# Patient Record
Sex: MALE | Race: WHITE | Employment: OTHER | URBAN - METROPOLITAN AREA
[De-identification: names, ages, dates, MRNs, and addresses within clinical notes are randomized per-mention and may not be internally consistent; named-entity substitution may affect disease eponyms.]

---

## 2017-01-01 ENCOUNTER — OFFICE VISIT (OUTPATIENT)
Dept: ORTHOPEDIC SURGERY | Age: 82
End: 2017-01-01

## 2017-01-01 VITALS — HEIGHT: 69 IN | BODY MASS INDEX: 26.66 KG/M2 | WEIGHT: 180 LBS

## 2017-01-01 DIAGNOSIS — M25.561 PAIN, JOINT, KNEE, RIGHT: Primary | ICD-10-CM

## 2017-01-01 DIAGNOSIS — R29.898 WEAKNESS OF RIGHT LOWER EXTREMITY: ICD-10-CM

## 2017-01-01 DIAGNOSIS — R26.89 BALANCE PROBLEM: ICD-10-CM

## 2017-01-01 PROCEDURE — 73562 X-RAY EXAM OF KNEE 3: CPT | Performed by: PHYSICIAN ASSISTANT

## 2017-01-01 PROCEDURE — 4040F PNEUMOC VAC/ADMIN/RCVD: CPT | Performed by: PHYSICIAN ASSISTANT

## 2017-01-01 PROCEDURE — G8484 FLU IMMUNIZE NO ADMIN: HCPCS | Performed by: PHYSICIAN ASSISTANT

## 2017-01-01 PROCEDURE — G8419 CALC BMI OUT NRM PARAM NOF/U: HCPCS | Performed by: PHYSICIAN ASSISTANT

## 2017-01-01 PROCEDURE — 99213 OFFICE O/P EST LOW 20 MIN: CPT | Performed by: PHYSICIAN ASSISTANT

## 2017-01-01 PROCEDURE — 1123F ACP DISCUSS/DSCN MKR DOCD: CPT | Performed by: PHYSICIAN ASSISTANT

## 2017-01-01 PROCEDURE — 1036F TOBACCO NON-USER: CPT | Performed by: PHYSICIAN ASSISTANT

## 2017-01-01 PROCEDURE — G8427 DOCREV CUR MEDS BY ELIG CLIN: HCPCS | Performed by: PHYSICIAN ASSISTANT

## 2017-10-04 PROBLEM — R29.898 WEAKNESS OF RIGHT LOWER EXTREMITY: Status: ACTIVE | Noted: 2017-01-01

## 2017-10-04 NOTE — PROGRESS NOTES
Chief Complaint    Knee Pain (rt knee/leg keeps giving,  not sure if it the knee, has had several falls; hx of tkr (bilat) yrs ago)      History of Present Illness:  Cris Leblanc is a 80 y.o. male who comes in today accompanied by his son for evaluation treatment of right leg weakness. He is referred in today for the consultation request of his PCP Dr. Yolette Mendieta. He and his son both report a several month history of the right leg giving out on him. He has a history of a right total knee replacement which was performed by one of the [facility] doctors about 20 years ago. He's had no complaints of any pain in the right knee. He did have a significant fall a little over a week ago where he injured his right arm sustained a small superficial laceration. The patient does ambulate with a walker. He denies any hip or groin pain. He denies any complaints of any numbness or tingling. There is no other symptomatic complaints other than the occasional instability in the right knee. Pain Assessment  Location of Pain: Leg  Location Modifiers: Right  Severity of Pain: 0 (no strength)  Limiting Behavior: Yes  Result of Injury: Yes  Work-Related Injury: No  Are there other pain locations you wish to document?: No    Medical History:  Patient's medications, allergies, past medical, surgical, social and family histories were reviewed and updated as appropriate. Review of Systems:  Pertinent items are noted in HPI  Review of systems reviewed from Patient History Form dated on 10/4/2017 and available in the patient's chart under the Media tab. Vital Signs:  Ht 5' 9\" (1.753 m)  Wt 180 lb (81.6 kg)  BMI 26.58 kg/m2    General Exam:   Constitutional: Patient is adequately groomed with no evidence of malnutrition  DTRs: Deep tendon reflexes are intact  Mental Status: The patient is oriented to time, place and person. The patient's mood and affect are appropriate.   Lymphatic: The lymphatic examination bilaterally reveals all areas to be without enlargement or induration. Vascular: Examination reveals no swelling or calf tenderness. Peripheral pulses are palpable and 2+. Neurological: The patient has good coordination. There is no weakness or sensory deficit. Right Knee Examination:    Inspection:  Well-healed surgical incision. No signs of infection. No warmth, swelling, or erythema    Palpation:  Nontender to palpation. Range of Motion:  Full range of motion of the knee, 0-120°. Strength: There is some weakness in the right leg particularly with hip flexion at 4/5 in knee extension at 4+/5. Special Tests:  the knee is stable to varus and valgus stress testing. Skin: There are no rashes, ulcerations or lesions. Gait:  brought in via wheelchair. Reflex Normal deep tendon reflexes    Examination of the right hip reveals intact skin. The patient demonstrates full painless range of motion with regards to flexion, abduction, internal and external rotation. There is no tenderness about the greater trochanter. There is a negative straight leg raise against resistance and a negative logroll maneuver. . Strength is 4/5 throughout all planes. Additional Examinations:         Left Lower Extremity: Examination of the left lower extremity does not show any tenderness, deformity or injury. Range of motion is unremarkable. There is no gross instability. There are no rashes, ulcerations or lesions. Strength and tone are normal.    Radiology:     X-rays obtained and reviewed in office:  Views 3 views including AP, lateral, and skyline  Location right knee  Impression good overall alignment of the right knee prosthesis. No septic or aseptic loosening no periprosthetic fractures. No evidence of any polyethylene wear.        I also reviewed x-rays of the right hip taken in May 2016 which showed well-maintained joint space without any evidence of any high-grade arthritic changes. Impression:  Encounter Diagnoses   Name Primary?  Pain, joint, knee, right Yes    Weakness of right lower extremity     Balance problem        Office Procedures:  Orders Placed This Encounter   Procedures    XR KNEE RIGHT (3 VIEWS)     [id number]     Order Specific Question:   Reason for exam:     Answer:   Pain    OSR PT [facility] Physical Therapy     Referral Priority:   Routine     Referral Type:   Eval and Treat     Referral Reason:   Specialty Services Required     Requested Specialty:   Physical Therapy     Number of Visits Requested:   1       Treatment Plan: Today we've gone over the diagnosis with the recommendations with the patient as well as his son. I believe he is dealing with more right-sided weakness and balance issues. I recommended a referral to outpatient physical therapy along with a knee brace. They state that they have several braces at home which he'll try to see if this helps symptoms for a period he can continue to use a walker to prevent any additional falls.

## 2017-10-04 NOTE — MR AVS SNAPSHOT
After Visit Summary             Lane Temple   10/4/2017 1:30 PM   Office Visit    Description:  Male : 1933   Provider:  CHEY Anthony   Department:  [facility]              Your Follow-Up and Future Appointments         Below is a list of your follow-up and future appointments. This may not be a complete list as you may have made appointments directly with providers that we are not aware of or your providers may have made some for you. Please call your providers to confirm appointments. It is important to keep your appointments. Please bring your current insurance card, photo ID, co-pay, and all medication bottles to your appointment. If self-pay, payment is expected at the time of service. Your To-Do List     Follow-Up    Return if symptoms worsen or fail to improve. Information from Your Visit        Department     Name Address Phone Fax    [facility] [address] [facility] [address] [phone]      You Were Seen for:         Comments    Pain, joint, knee, right   [100101]         Vital Signs     Height Weight Body Mass Index Smoking Status          5' 9\" (1.753 m) 180 lb (81.6 kg) 26.58 kg/m2 Former Smoker        Additional Information about your Body Mass Index (BMI)           Your BMI as listed above is considered overweight (25.0-29.9). BMI is an estimate of body fat, calculated from your height and weight. The higher your BMI, the greater your risk of heart disease, high blood pressure, type 2 diabetes, stroke, gallstones, arthritis, sleep apnea, and certain cancers. BMI is not perfect. It may overestimate body fat in athletes and people who are more muscular. If your body fat is high you can improve your BMI by decreasing your calorie intake and becoming more physically active.      Learn more at: [email or web address]          Instructions

## 2018-01-01 ENCOUNTER — HOSPITAL ENCOUNTER (INPATIENT)
Age: 83
LOS: 2 days | Discharge: HOSPICE | DRG: 871 | End: 2018-08-21
Attending: EMERGENCY MEDICINE | Admitting: INTERNAL MEDICINE
Payer: MEDICARE

## 2018-01-01 ENCOUNTER — OFFICE VISIT (OUTPATIENT)
Dept: CARDIOLOGY CLINIC | Age: 83
End: 2018-01-01

## 2018-01-01 ENCOUNTER — HOSPITAL ENCOUNTER (INPATIENT)
Age: 83
LOS: 3 days | DRG: 871 | End: 2018-08-24
Attending: INTERNAL MEDICINE | Admitting: INTERNAL MEDICINE
Payer: COMMERCIAL

## 2018-01-01 ENCOUNTER — APPOINTMENT (OUTPATIENT)
Dept: CT IMAGING | Age: 83
DRG: 871 | End: 2018-01-01
Attending: INTERNAL MEDICINE
Payer: MEDICARE

## 2018-01-01 ENCOUNTER — APPOINTMENT (OUTPATIENT)
Dept: GENERAL RADIOLOGY | Age: 83
DRG: 871 | End: 2018-01-01
Payer: MEDICARE

## 2018-01-01 ENCOUNTER — TELEPHONE (OUTPATIENT)
Dept: CARDIOLOGY CLINIC | Age: 83
End: 2018-01-01

## 2018-01-01 ENCOUNTER — APPOINTMENT (OUTPATIENT)
Dept: GENERAL RADIOLOGY | Age: 83
DRG: 871 | End: 2018-01-01
Attending: INTERNAL MEDICINE
Payer: MEDICARE

## 2018-01-01 ENCOUNTER — APPOINTMENT (OUTPATIENT)
Dept: ULTRASOUND IMAGING | Age: 83
DRG: 871 | End: 2018-01-01
Attending: INTERNAL MEDICINE
Payer: MEDICARE

## 2018-01-01 ENCOUNTER — APPOINTMENT (OUTPATIENT)
Dept: CT IMAGING | Age: 83
DRG: 871 | End: 2018-01-01
Payer: MEDICARE

## 2018-01-01 VITALS
HEIGHT: 68 IN | WEIGHT: 169.97 LBS | TEMPERATURE: 98.5 F | HEART RATE: 94 BPM | RESPIRATION RATE: 24 BRPM | OXYGEN SATURATION: 93 % | BODY MASS INDEX: 25.76 KG/M2 | SYSTOLIC BLOOD PRESSURE: 101 MMHG | DIASTOLIC BLOOD PRESSURE: 61 MMHG

## 2018-01-01 VITALS
DIASTOLIC BLOOD PRESSURE: 32 MMHG | TEMPERATURE: 97.9 F | SYSTOLIC BLOOD PRESSURE: 64 MMHG | OXYGEN SATURATION: 93 % | HEART RATE: 120 BPM | RESPIRATION RATE: 20 BRPM

## 2018-01-01 VITALS
DIASTOLIC BLOOD PRESSURE: 60 MMHG | BODY MASS INDEX: 22.81 KG/M2 | WEIGHT: 154 LBS | HEART RATE: 95 BPM | SYSTOLIC BLOOD PRESSURE: 120 MMHG | HEIGHT: 69 IN | OXYGEN SATURATION: 97 %

## 2018-01-01 DIAGNOSIS — I49.8 OTHER CARDIAC ARRHYTHMIA: ICD-10-CM

## 2018-01-01 DIAGNOSIS — I48.91 ATRIAL FIBRILLATION, UNSPECIFIED TYPE (HCC): ICD-10-CM

## 2018-01-01 DIAGNOSIS — I95.9 HYPOTENSION, UNSPECIFIED HYPOTENSION TYPE: Primary | ICD-10-CM

## 2018-01-01 DIAGNOSIS — F10.11 H/O ETOH ABUSE: ICD-10-CM

## 2018-01-01 DIAGNOSIS — R41.0 CONFUSION: ICD-10-CM

## 2018-01-01 DIAGNOSIS — R06.89 DYSPNEA AND RESPIRATORY ABNORMALITIES: ICD-10-CM

## 2018-01-01 DIAGNOSIS — R06.00 DYSPNEA AND RESPIRATORY ABNORMALITIES: ICD-10-CM

## 2018-01-01 DIAGNOSIS — I10 ESSENTIAL HYPERTENSION: ICD-10-CM

## 2018-01-01 DIAGNOSIS — I63.9 ACUTE CVA (CEREBROVASCULAR ACCIDENT) (HCC): Primary | ICD-10-CM

## 2018-01-01 LAB
A/G RATIO: 0.8 (ref 1.1–2.2)
ALBUMIN SERPL-MCNC: 1.9 G/DL (ref 3.4–5)
ALBUMIN SERPL-MCNC: 2.2 G/DL (ref 3.4–5)
ALBUMIN SERPL-MCNC: 2.6 G/DL (ref 3.4–5)
ALP BLD-CCNC: 203 U/L (ref 40–129)
ALT SERPL-CCNC: 18 U/L (ref 10–40)
ANION GAP SERPL CALCULATED.3IONS-SCNC: 11 MMOL/L (ref 3–16)
ANION GAP SERPL CALCULATED.3IONS-SCNC: 12 MMOL/L (ref 3–16)
ANION GAP SERPL CALCULATED.3IONS-SCNC: 15 MMOL/L (ref 3–16)
ANISOCYTOSIS: ABNORMAL
AST SERPL-CCNC: 15 U/L (ref 15–37)
BACTERIA: ABNORMAL /HPF
BACTERIA: ABNORMAL /HPF
BANDED NEUTROPHILS RELATIVE PERCENT: 7 % (ref 0–7)
BANDED NEUTROPHILS RELATIVE PERCENT: 8 % (ref 0–7)
BASE EXCESS VENOUS: -9 (ref -3–3)
BASOPHILS ABSOLUTE: 0 K/UL (ref 0–0.2)
BASOPHILS ABSOLUTE: 0 K/UL (ref 0–0.2)
BASOPHILS ABSOLUTE: 0.1 K/UL (ref 0–0.2)
BASOPHILS RELATIVE PERCENT: 0 %
BASOPHILS RELATIVE PERCENT: 0 %
BASOPHILS RELATIVE PERCENT: 0.2 %
BILIRUB SERPL-MCNC: 0.7 MG/DL (ref 0–1)
BILIRUBIN URINE: ABNORMAL
BILIRUBIN URINE: ABNORMAL
BLOOD CULTURE, ROUTINE: NORMAL
BLOOD, URINE: ABNORMAL
BLOOD, URINE: ABNORMAL
BUN BLDV-MCNC: 59 MG/DL (ref 7–20)
BUN BLDV-MCNC: 64 MG/DL (ref 7–20)
BUN BLDV-MCNC: 68 MG/DL (ref 7–20)
C DIFFICILE TOXIN, EIA: NORMAL
CALCIUM SERPL-MCNC: 6.8 MG/DL (ref 8.3–10.6)
CALCIUM SERPL-MCNC: 7.4 MG/DL (ref 8.3–10.6)
CALCIUM SERPL-MCNC: 8.3 MG/DL (ref 8.3–10.6)
CHLORIDE BLD-SCNC: 104 MMOL/L (ref 99–110)
CHLORIDE BLD-SCNC: 108 MMOL/L (ref 99–110)
CHLORIDE BLD-SCNC: 116 MMOL/L (ref 99–110)
CHLORIDE URINE RANDOM: <20 MMOL/L
CLARITY: ABNORMAL
CLARITY: ABNORMAL
CO2: 12 MMOL/L (ref 21–32)
CO2: 15 MMOL/L (ref 21–32)
CO2: 16 MMOL/L (ref 21–32)
COLOR: ABNORMAL
COLOR: ABNORMAL
CREAT SERPL-MCNC: 2.3 MG/DL (ref 0.8–1.3)
CREAT SERPL-MCNC: 2.5 MG/DL (ref 0.8–1.3)
CREAT SERPL-MCNC: 2.8 MG/DL (ref 0.8–1.3)
CRENATED RBC'S: ABNORMAL
CULTURE, BLOOD 2: NORMAL
DOHLE BODIES: PRESENT
EOSINOPHILS ABSOLUTE: 0 K/UL (ref 0–0.6)
EOSINOPHILS RELATIVE PERCENT: 0 %
EOSINOPHILS RELATIVE PERCENT: 0 %
EOSINOPHILS RELATIVE PERCENT: 0.1 %
EPITHELIAL CELLS, UA: ABNORMAL /HPF
EPITHELIAL CELLS, UA: ABNORMAL /HPF
GFR AFRICAN AMERICAN: 26
GFR AFRICAN AMERICAN: 30
GFR AFRICAN AMERICAN: 33
GFR NON-AFRICAN AMERICAN: 22
GFR NON-AFRICAN AMERICAN: 25
GFR NON-AFRICAN AMERICAN: 27
GLOBULIN: 3.2 G/DL
GLUCOSE BLD-MCNC: 119 MG/DL (ref 70–99)
GLUCOSE BLD-MCNC: 125 MG/DL (ref 70–99)
GLUCOSE BLD-MCNC: 125 MG/DL (ref 70–99)
GLUCOSE BLD-MCNC: 126 MG/DL (ref 70–99)
GLUCOSE BLD-MCNC: 129 MG/DL (ref 70–99)
GLUCOSE BLD-MCNC: 134 MG/DL (ref 70–99)
GLUCOSE BLD-MCNC: 137 MG/DL (ref 70–99)
GLUCOSE BLD-MCNC: 137 MG/DL (ref 70–99)
GLUCOSE BLD-MCNC: 144 MG/DL (ref 70–99)
GLUCOSE BLD-MCNC: 144 MG/DL (ref 70–99)
GLUCOSE BLD-MCNC: 165 MG/DL (ref 70–99)
GLUCOSE BLD-MCNC: 180 MG/DL (ref 70–99)
GLUCOSE URINE: 100 MG/DL
GLUCOSE URINE: NEGATIVE MG/DL
HCO3 VENOUS: 16.9 MMOL/L (ref 23–29)
HCT VFR BLD CALC: 23.7 % (ref 40.5–52.5)
HCT VFR BLD CALC: 29.1 % (ref 40.5–52.5)
HCT VFR BLD CALC: 32.7 % (ref 40.5–52.5)
HEMOGLOBIN: 10.3 G/DL (ref 13.5–17.5)
HEMOGLOBIN: 7.5 G/DL (ref 13.5–17.5)
HEMOGLOBIN: 9.1 G/DL (ref 13.5–17.5)
HYPOCHROMIA: ABNORMAL
KETONES, URINE: ABNORMAL MG/DL
KETONES, URINE: NEGATIVE MG/DL
LACTATE: 1.57 MMOL/L (ref 0.4–2)
LACTIC ACID: 1.2 MMOL/L (ref 0.4–2)
LEUKOCYTE ESTERASE, URINE: ABNORMAL
LEUKOCYTE ESTERASE, URINE: NEGATIVE
LYMPHOCYTES ABSOLUTE: 0 K/UL (ref 1–5.1)
LYMPHOCYTES ABSOLUTE: 0.8 K/UL (ref 1–5.1)
LYMPHOCYTES ABSOLUTE: 0.9 K/UL (ref 1–5.1)
LYMPHOCYTES RELATIVE PERCENT: 0 %
LYMPHOCYTES RELATIVE PERCENT: 3.5 %
LYMPHOCYTES RELATIVE PERCENT: 4 %
MAGNESIUM: 1.7 MG/DL (ref 1.8–2.4)
MAGNESIUM: 1.8 MG/DL (ref 1.8–2.4)
MAGNESIUM: 2.1 MG/DL (ref 1.8–2.4)
MCH RBC QN AUTO: 26.4 PG (ref 26–34)
MCH RBC QN AUTO: 26.9 PG (ref 26–34)
MCH RBC QN AUTO: 27 PG (ref 26–34)
MCHC RBC AUTO-ENTMCNC: 31.1 G/DL (ref 31–36)
MCHC RBC AUTO-ENTMCNC: 31.6 G/DL (ref 31–36)
MCHC RBC AUTO-ENTMCNC: 31.8 G/DL (ref 31–36)
MCV RBC AUTO: 83.5 FL (ref 80–100)
MCV RBC AUTO: 84.6 FL (ref 80–100)
MCV RBC AUTO: 86.9 FL (ref 80–100)
METAMYELOCYTES RELATIVE PERCENT: 2 %
MICROCYTES: ABNORMAL
MICROSCOPIC EXAMINATION: YES
MICROSCOPIC EXAMINATION: YES
MONOCYTES ABSOLUTE: 0.6 K/UL (ref 0–1.3)
MONOCYTES ABSOLUTE: 1.1 K/UL (ref 0–1.3)
MONOCYTES ABSOLUTE: 1.4 K/UL (ref 0–1.3)
MONOCYTES RELATIVE PERCENT: 3 %
MONOCYTES RELATIVE PERCENT: 4.5 %
MONOCYTES RELATIVE PERCENT: 5 %
NEUTROPHILS ABSOLUTE: 19.6 K/UL (ref 1.7–7.7)
NEUTROPHILS ABSOLUTE: 22.6 K/UL (ref 1.7–7.7)
NEUTROPHILS ABSOLUTE: 26.8 K/UL (ref 1.7–7.7)
NEUTROPHILS RELATIVE PERCENT: 84 %
NEUTROPHILS RELATIVE PERCENT: 87 %
NEUTROPHILS RELATIVE PERCENT: 91.7 %
NITRITE, URINE: POSITIVE
NITRITE, URINE: POSITIVE
O2 SAT, VEN: 56 %
PCO2, VEN: 29.7 MM HG (ref 40–50)
PDW BLD-RTO: 16.2 % (ref 12.4–15.4)
PDW BLD-RTO: 16.6 % (ref 12.4–15.4)
PDW BLD-RTO: 16.7 % (ref 12.4–15.4)
PERFORMED ON: ABNORMAL
PH UA: 5.5
PH UA: 6.5
PH VENOUS: 7.36 (ref 7.35–7.45)
PHOSPHORUS: 4.5 MG/DL (ref 2.5–4.9)
PHOSPHORUS: 4.5 MG/DL (ref 2.5–4.9)
PLATELET # BLD: 262 K/UL (ref 135–450)
PLATELET # BLD: 269 K/UL (ref 135–450)
PLATELET # BLD: 289 K/UL (ref 135–450)
PMV BLD AUTO: 8 FL (ref 5–10.5)
PMV BLD AUTO: 8.1 FL (ref 5–10.5)
PMV BLD AUTO: 8.5 FL (ref 5–10.5)
PO2, VEN: 30 MM HG
POC SAMPLE TYPE: ABNORMAL
POTASSIUM REFLEX MAGNESIUM: 3.4 MMOL/L (ref 3.5–5.1)
POTASSIUM SERPL-SCNC: 3.3 MMOL/L (ref 3.5–5.1)
POTASSIUM SERPL-SCNC: 3.5 MMOL/L (ref 3.5–5.1)
POTASSIUM, UR: 56.4 MMOL/L
PRO-BNP: 1252 PG/ML (ref 0–449)
PROTEIN UA: 100 MG/DL
PROTEIN UA: >=300 MG/DL
RBC # BLD: 2.8 M/UL (ref 4.2–5.9)
RBC # BLD: 3.35 M/UL (ref 4.2–5.9)
RBC # BLD: 3.92 M/UL (ref 4.2–5.9)
RBC UA: >100 /HPF (ref 0–2)
RBC UA: >100 /HPF (ref 0–2)
SLIDE REVIEW: ABNORMAL
SODIUM BLD-SCNC: 135 MMOL/L (ref 136–145)
SODIUM BLD-SCNC: 135 MMOL/L (ref 136–145)
SODIUM BLD-SCNC: 139 MMOL/L (ref 136–145)
SODIUM URINE: <20 MMOL/L
SPECIFIC GRAVITY UA: 1.02
SPECIFIC GRAVITY UA: 1.02
TCO2 CALC VENOUS: 18 MMOL/L
TOTAL CK: 32 U/L (ref 39–308)
TOTAL PROTEIN: 5.8 G/DL (ref 6.4–8.2)
TROPONIN: 0.03 NG/ML
URINE CULTURE, ROUTINE: NORMAL
URINE REFLEX TO CULTURE: YES
URINE TYPE: ABNORMAL
URINE TYPE: ABNORMAL
UROBILINOGEN, URINE: 0.2 E.U./DL
UROBILINOGEN, URINE: 1 E.U./DL
WBC # BLD: 21.1 K/UL (ref 4–11)
WBC # BLD: 24.7 K/UL (ref 4–11)
WBC # BLD: 28.2 K/UL (ref 4–11)
WBC UA: ABNORMAL /HPF (ref 0–5)
WBC UA: ABNORMAL /HPF (ref 0–5)

## 2018-01-01 PROCEDURE — 1250000000 HC SEMI PRIVATE HOSPICE R&B

## 2018-01-01 PROCEDURE — 85025 COMPLETE CBC W/AUTO DIFF WBC: CPT

## 2018-01-01 PROCEDURE — 2500000003 HC RX 250 WO HCPCS: Performed by: INTERNAL MEDICINE

## 2018-01-01 PROCEDURE — 2580000003 HC RX 258: Performed by: EMERGENCY MEDICINE

## 2018-01-01 PROCEDURE — 93005 ELECTROCARDIOGRAM TRACING: CPT | Performed by: EMERGENCY MEDICINE

## 2018-01-01 PROCEDURE — 81001 URINALYSIS AUTO W/SCOPE: CPT

## 2018-01-01 PROCEDURE — 70450 CT HEAD/BRAIN W/O DYE: CPT

## 2018-01-01 PROCEDURE — 80053 COMPREHEN METABOLIC PANEL: CPT

## 2018-01-01 PROCEDURE — 6360000002 HC RX W HCPCS: Performed by: EMERGENCY MEDICINE

## 2018-01-01 PROCEDURE — 83735 ASSAY OF MAGNESIUM: CPT

## 2018-01-01 PROCEDURE — 6370000000 HC RX 637 (ALT 250 FOR IP): Performed by: STUDENT IN AN ORGANIZED HEALTH CARE EDUCATION/TRAINING PROGRAM

## 2018-01-01 PROCEDURE — 96365 THER/PROPH/DIAG IV INF INIT: CPT

## 2018-01-01 PROCEDURE — 97166 OT EVAL MOD COMPLEX 45 MIN: CPT

## 2018-01-01 PROCEDURE — 84133 ASSAY OF URINE POTASSIUM: CPT

## 2018-01-01 PROCEDURE — 6370000000 HC RX 637 (ALT 250 FOR IP): Performed by: INTERNAL MEDICINE

## 2018-01-01 PROCEDURE — 97530 THERAPEUTIC ACTIVITIES: CPT

## 2018-01-01 PROCEDURE — 84300 ASSAY OF URINE SODIUM: CPT

## 2018-01-01 PROCEDURE — 2700000000 HC OXYGEN THERAPY PER DAY

## 2018-01-01 PROCEDURE — 87324 CLOSTRIDIUM AG IA: CPT

## 2018-01-01 PROCEDURE — G8997 SWALLOW GOAL STATUS: HCPCS

## 2018-01-01 PROCEDURE — 51798 US URINE CAPACITY MEASURE: CPT

## 2018-01-01 PROCEDURE — G8987 SELF CARE CURRENT STATUS: HCPCS

## 2018-01-01 PROCEDURE — G8427 DOCREV CUR MEDS BY ELIG CLIN: HCPCS | Performed by: INTERNAL MEDICINE

## 2018-01-01 PROCEDURE — 94640 AIRWAY INHALATION TREATMENT: CPT

## 2018-01-01 PROCEDURE — 99204 OFFICE O/P NEW MOD 45 MIN: CPT | Performed by: INTERNAL MEDICINE

## 2018-01-01 PROCEDURE — 6360000002 HC RX W HCPCS: Performed by: STUDENT IN AN ORGANIZED HEALTH CARE EDUCATION/TRAINING PROGRAM

## 2018-01-01 PROCEDURE — 1200000000 HC SEMI PRIVATE

## 2018-01-01 PROCEDURE — G8420 CALC BMI NORM PARAMETERS: HCPCS | Performed by: INTERNAL MEDICINE

## 2018-01-01 PROCEDURE — 83605 ASSAY OF LACTIC ACID: CPT

## 2018-01-01 PROCEDURE — 1123F ACP DISCUSS/DSCN MKR DOCD: CPT | Performed by: INTERNAL MEDICINE

## 2018-01-01 PROCEDURE — 6360000002 HC RX W HCPCS

## 2018-01-01 PROCEDURE — 96361 HYDRATE IV INFUSION ADD-ON: CPT

## 2018-01-01 PROCEDURE — 6370000000 HC RX 637 (ALT 250 FOR IP): Performed by: EMERGENCY MEDICINE

## 2018-01-01 PROCEDURE — 87040 BLOOD CULTURE FOR BACTERIA: CPT

## 2018-01-01 PROCEDURE — 2580000003 HC RX 258: Performed by: STUDENT IN AN ORGANIZED HEALTH CARE EDUCATION/TRAINING PROGRAM

## 2018-01-01 PROCEDURE — 97163 PT EVAL HIGH COMPLEX 45 MIN: CPT

## 2018-01-01 PROCEDURE — G8979 MOBILITY GOAL STATUS: HCPCS

## 2018-01-01 PROCEDURE — 76770 US EXAM ABDO BACK WALL COMP: CPT

## 2018-01-01 PROCEDURE — 92610 EVALUATE SWALLOWING FUNCTION: CPT

## 2018-01-01 PROCEDURE — 80069 RENAL FUNCTION PANEL: CPT

## 2018-01-01 PROCEDURE — 87449 NOS EACH ORGANISM AG IA: CPT

## 2018-01-01 PROCEDURE — 83880 ASSAY OF NATRIURETIC PEPTIDE: CPT

## 2018-01-01 PROCEDURE — 94761 N-INVAS EAR/PLS OXIMETRY MLT: CPT

## 2018-01-01 PROCEDURE — 87086 URINE CULTURE/COLONY COUNT: CPT

## 2018-01-01 PROCEDURE — 2500000003 HC RX 250 WO HCPCS: Performed by: STUDENT IN AN ORGANIZED HEALTH CARE EDUCATION/TRAINING PROGRAM

## 2018-01-01 PROCEDURE — 6360000004 HC RX CONTRAST MEDICATION: Performed by: STUDENT IN AN ORGANIZED HEALTH CARE EDUCATION/TRAINING PROGRAM

## 2018-01-01 PROCEDURE — 36415 COLL VENOUS BLD VENIPUNCTURE: CPT

## 2018-01-01 PROCEDURE — 74176 CT ABD & PELVIS W/O CONTRAST: CPT

## 2018-01-01 PROCEDURE — 31720 CLEARANCE OF AIRWAYS: CPT

## 2018-01-01 PROCEDURE — 82550 ASSAY OF CK (CPK): CPT

## 2018-01-01 PROCEDURE — 82436 ASSAY OF URINE CHLORIDE: CPT

## 2018-01-01 PROCEDURE — 1036F TOBACCO NON-USER: CPT | Performed by: INTERNAL MEDICINE

## 2018-01-01 PROCEDURE — 94664 DEMO&/EVAL PT USE INHALER: CPT

## 2018-01-01 PROCEDURE — G8996 SWALLOW CURRENT STATUS: HCPCS

## 2018-01-01 PROCEDURE — G8988 SELF CARE GOAL STATUS: HCPCS

## 2018-01-01 PROCEDURE — 96367 TX/PROPH/DG ADDL SEQ IV INF: CPT

## 2018-01-01 PROCEDURE — 51702 INSERT TEMP BLADDER CATH: CPT

## 2018-01-01 PROCEDURE — 84484 ASSAY OF TROPONIN QUANT: CPT

## 2018-01-01 PROCEDURE — 4040F PNEUMOC VAC/ADMIN/RCVD: CPT | Performed by: INTERNAL MEDICINE

## 2018-01-01 PROCEDURE — 99291 CRITICAL CARE FIRST HOUR: CPT

## 2018-01-01 PROCEDURE — 71045 X-RAY EXAM CHEST 1 VIEW: CPT

## 2018-01-01 PROCEDURE — 82803 BLOOD GASES ANY COMBINATION: CPT

## 2018-01-01 PROCEDURE — 92526 ORAL FUNCTION THERAPY: CPT

## 2018-01-01 PROCEDURE — 1111F DSCHRG MED/CURRENT MED MERGE: CPT | Performed by: INTERNAL MEDICINE

## 2018-01-01 PROCEDURE — G8978 MOBILITY CURRENT STATUS: HCPCS

## 2018-01-01 PROCEDURE — G8598 ASA/ANTIPLAT THER USED: HCPCS | Performed by: INTERNAL MEDICINE

## 2018-01-01 RX ORDER — SODIUM CHLORIDE 9 MG/ML
INJECTION, SOLUTION INTRAVENOUS CONTINUOUS
Status: DISCONTINUED | OUTPATIENT
Start: 2018-01-01 | End: 2018-01-01

## 2018-01-01 RX ORDER — SODIUM CHLORIDE 0.9 % (FLUSH) 0.9 %
10 SYRINGE (ML) INJECTION PRN
Status: DISCONTINUED | OUTPATIENT
Start: 2018-01-01 | End: 2018-01-01

## 2018-01-01 RX ORDER — POTASSIUM CHLORIDE 20 MEQ/1
40 TABLET, EXTENDED RELEASE ORAL ONCE
Status: COMPLETED | OUTPATIENT
Start: 2018-01-01 | End: 2018-01-01

## 2018-01-01 RX ORDER — CASTOR OIL AND BALSAM, PERU 788; 87 MG/G; MG/G
OINTMENT TOPICAL 2 TIMES DAILY
Status: DISCONTINUED | OUTPATIENT
Start: 2018-01-01 | End: 2018-01-01 | Stop reason: HOSPADM

## 2018-01-01 RX ORDER — ACETAMINOPHEN 160 MG/5ML
650 SOLUTION ORAL EVERY 4 HOURS PRN
Status: DISCONTINUED | OUTPATIENT
Start: 2018-01-01 | End: 2018-01-01 | Stop reason: HOSPADM

## 2018-01-01 RX ORDER — MAGNESIUM SULFATE IN WATER 40 MG/ML
2 INJECTION, SOLUTION INTRAVENOUS ONCE
Status: COMPLETED | OUTPATIENT
Start: 2018-01-01 | End: 2018-01-01

## 2018-01-01 RX ORDER — PANTOPRAZOLE SODIUM 40 MG/1
40 TABLET, DELAYED RELEASE ORAL
Status: DISCONTINUED | OUTPATIENT
Start: 2018-01-01 | End: 2018-01-01

## 2018-01-01 RX ORDER — SENNA PLUS 8.6 MG/1
1 TABLET ORAL DAILY PRN
Status: DISCONTINUED | OUTPATIENT
Start: 2018-01-01 | End: 2018-01-01 | Stop reason: HOSPADM

## 2018-01-01 RX ORDER — MORPHINE SULFATE 20 MG/ML
2.5 SOLUTION ORAL
Status: DISCONTINUED | OUTPATIENT
Start: 2018-01-01 | End: 2018-01-01 | Stop reason: HOSPADM

## 2018-01-01 RX ORDER — DEXTROSE MONOHYDRATE 25 G/50ML
12.5 INJECTION, SOLUTION INTRAVENOUS PRN
Status: DISCONTINUED | OUTPATIENT
Start: 2018-01-01 | End: 2018-01-01

## 2018-01-01 RX ORDER — BUDESONIDE 0.5 MG/2ML
INHALANT ORAL
Status: COMPLETED
Start: 2018-01-01 | End: 2018-01-01

## 2018-01-01 RX ORDER — 0.9 % SODIUM CHLORIDE 0.9 %
500 INTRAVENOUS SOLUTION INTRAVENOUS ONCE
Status: COMPLETED | OUTPATIENT
Start: 2018-01-01 | End: 2018-01-01

## 2018-01-01 RX ORDER — CASTOR OIL AND BALSAM, PERU 788; 87 MG/G; MG/G
OINTMENT TOPICAL
Status: DISCONTINUED | OUTPATIENT
Start: 2018-01-01 | End: 2018-01-01 | Stop reason: HOSPADM

## 2018-01-01 RX ORDER — 0.9 % SODIUM CHLORIDE 0.9 %
1000 INTRAVENOUS SOLUTION INTRAVENOUS ONCE
Status: COMPLETED | OUTPATIENT
Start: 2018-01-01 | End: 2018-01-01

## 2018-01-01 RX ORDER — LEVALBUTEROL 1.25 MG/.5ML
SOLUTION, CONCENTRATE RESPIRATORY (INHALATION)
Status: COMPLETED
Start: 2018-01-01 | End: 2018-01-01

## 2018-01-01 RX ORDER — ONDANSETRON 2 MG/ML
4 INJECTION INTRAMUSCULAR; INTRAVENOUS EVERY 6 HOURS PRN
Status: DISCONTINUED | OUTPATIENT
Start: 2018-01-01 | End: 2018-01-01

## 2018-01-01 RX ORDER — LORAZEPAM 2 MG/ML
1 CONCENTRATE ORAL
Status: DISCONTINUED | OUTPATIENT
Start: 2018-01-01 | End: 2018-01-01 | Stop reason: HOSPADM

## 2018-01-01 RX ORDER — HEPARIN SODIUM 5000 [USP'U]/ML
80 INJECTION, SOLUTION INTRAVENOUS; SUBCUTANEOUS ONCE
Status: DISCONTINUED | OUTPATIENT
Start: 2018-01-01 | End: 2018-01-01

## 2018-01-01 RX ORDER — ONDANSETRON 2 MG/ML
4 INJECTION INTRAMUSCULAR; INTRAVENOUS EVERY 6 HOURS PRN
Status: DISCONTINUED | OUTPATIENT
Start: 2018-01-01 | End: 2018-01-01 | Stop reason: HOSPADM

## 2018-01-01 RX ORDER — ATORVASTATIN CALCIUM 40 MG/1
40 TABLET, FILM COATED ORAL NIGHTLY
Status: DISCONTINUED | OUTPATIENT
Start: 2018-01-01 | End: 2018-01-01

## 2018-01-01 RX ORDER — LORAZEPAM 2 MG/ML
1 INJECTION INTRAMUSCULAR
Status: DISCONTINUED | OUTPATIENT
Start: 2018-01-01 | End: 2018-01-01

## 2018-01-01 RX ORDER — POLYETHYLENE GLYCOL 3350 17 G/17G
17 POWDER, FOR SOLUTION ORAL DAILY PRN
Status: DISCONTINUED | OUTPATIENT
Start: 2018-01-01 | End: 2018-01-01

## 2018-01-01 RX ORDER — IPRATROPIUM BROMIDE AND ALBUTEROL SULFATE 2.5; .5 MG/3ML; MG/3ML
1 SOLUTION RESPIRATORY (INHALATION) EVERY 4 HOURS PRN
Status: DISCONTINUED | OUTPATIENT
Start: 2018-01-01 | End: 2018-01-01

## 2018-01-01 RX ORDER — SODIUM CHLORIDE 0.9 % (FLUSH) 0.9 %
10 SYRINGE (ML) INJECTION EVERY 12 HOURS SCHEDULED
Status: DISCONTINUED | OUTPATIENT
Start: 2018-01-01 | End: 2018-01-01

## 2018-01-01 RX ORDER — THIAMINE MONONITRATE (VIT B1) 100 MG
100 TABLET ORAL DAILY
Status: DISCONTINUED | OUTPATIENT
Start: 2018-01-01 | End: 2018-01-01

## 2018-01-01 RX ORDER — MORPHINE SULFATE 100 MG/5ML
5 SOLUTION ORAL
Status: DISCONTINUED | OUTPATIENT
Start: 2018-01-01 | End: 2018-01-01

## 2018-01-01 RX ORDER — LOPERAMIDE HYDROCHLORIDE 2 MG/1
2 CAPSULE ORAL 4 TIMES DAILY PRN
Status: DISCONTINUED | OUTPATIENT
Start: 2018-01-01 | End: 2018-01-01

## 2018-01-01 RX ORDER — LORAZEPAM 2 MG/ML
4 INJECTION INTRAMUSCULAR
Status: DISCONTINUED | OUTPATIENT
Start: 2018-01-01 | End: 2018-01-01

## 2018-01-01 RX ORDER — NICOTINE POLACRILEX 4 MG
15 LOZENGE BUCCAL PRN
Status: DISCONTINUED | OUTPATIENT
Start: 2018-01-01 | End: 2018-01-01

## 2018-01-01 RX ORDER — ATROPINE SULFATE 10 MG/ML
1 SOLUTION/ DROPS OPHTHALMIC EVERY 4 HOURS PRN
Status: DISCONTINUED | OUTPATIENT
Start: 2018-01-01 | End: 2018-01-01 | Stop reason: HOSPADM

## 2018-01-01 RX ORDER — LORAZEPAM 1 MG/1
3 TABLET ORAL
Status: DISCONTINUED | OUTPATIENT
Start: 2018-01-01 | End: 2018-01-01

## 2018-01-01 RX ORDER — LORAZEPAM 1 MG/1
2 TABLET ORAL
Status: DISCONTINUED | OUTPATIENT
Start: 2018-01-01 | End: 2018-01-01

## 2018-01-01 RX ORDER — 0.9 % SODIUM CHLORIDE 0.9 %
1000 INTRAVENOUS SOLUTION INTRAVENOUS ONCE
Status: DISCONTINUED | OUTPATIENT
Start: 2018-01-01 | End: 2018-01-01

## 2018-01-01 RX ORDER — POLYETHYLENE GLYCOL 3350 17 G/17G
17 POWDER, FOR SOLUTION ORAL DAILY PRN
Status: DISCONTINUED | OUTPATIENT
Start: 2018-01-01 | End: 2018-01-01 | Stop reason: HOSPADM

## 2018-01-01 RX ORDER — LORAZEPAM 2 MG/ML
1 CONCENTRATE ORAL EVERY 8 HOURS PRN
Status: DISCONTINUED | OUTPATIENT
Start: 2018-01-01 | End: 2018-01-01

## 2018-01-01 RX ORDER — TAMSULOSIN HYDROCHLORIDE 0.4 MG/1
0.4 CAPSULE ORAL DAILY
Status: DISCONTINUED | OUTPATIENT
Start: 2018-01-01 | End: 2018-01-01

## 2018-01-01 RX ORDER — LORAZEPAM 1 MG/1
1 TABLET ORAL
Status: DISCONTINUED | OUTPATIENT
Start: 2018-01-01 | End: 2018-01-01

## 2018-01-01 RX ORDER — LORAZEPAM 1 MG/1
4 TABLET ORAL
Status: DISCONTINUED | OUTPATIENT
Start: 2018-01-01 | End: 2018-01-01

## 2018-01-01 RX ORDER — DOCUSATE SODIUM 100 MG/1
100 CAPSULE, LIQUID FILLED ORAL 2 TIMES DAILY
Status: DISCONTINUED | OUTPATIENT
Start: 2018-01-01 | End: 2018-01-01 | Stop reason: HOSPADM

## 2018-01-01 RX ORDER — 0.9 % SODIUM CHLORIDE 0.9 %
2040 INTRAVENOUS SOLUTION INTRAVENOUS ONCE
Status: COMPLETED | OUTPATIENT
Start: 2018-01-01 | End: 2018-01-01

## 2018-01-01 RX ORDER — POTASSIUM CHLORIDE 1.5 G/1.77G
20 POWDER, FOR SOLUTION ORAL
Status: DISCONTINUED | OUTPATIENT
Start: 2018-01-01 | End: 2018-01-01

## 2018-01-01 RX ORDER — HEPARIN SODIUM 10000 [USP'U]/100ML
18 INJECTION, SOLUTION INTRAVENOUS CONTINUOUS
Status: DISCONTINUED | OUTPATIENT
Start: 2018-01-01 | End: 2018-01-01

## 2018-01-01 RX ORDER — MORPHINE SULFATE 100 MG/5ML
5 SOLUTION ORAL
Status: DISCONTINUED | OUTPATIENT
Start: 2018-01-01 | End: 2018-01-01 | Stop reason: HOSPADM

## 2018-01-01 RX ORDER — 0.9 % SODIUM CHLORIDE 0.9 %
1000 INTRAVENOUS SOLUTION INTRAVENOUS PRN
Status: DISCONTINUED | OUTPATIENT
Start: 2018-01-01 | End: 2018-01-01

## 2018-01-01 RX ORDER — LORAZEPAM 2 MG/ML
3 INJECTION INTRAMUSCULAR
Status: DISCONTINUED | OUTPATIENT
Start: 2018-01-01 | End: 2018-01-01

## 2018-01-01 RX ORDER — ATROPINE SULFATE 10 MG/ML
2 SOLUTION/ DROPS OPHTHALMIC
Status: DISCONTINUED | OUTPATIENT
Start: 2018-01-01 | End: 2018-01-01 | Stop reason: HOSPADM

## 2018-01-01 RX ORDER — ACETAMINOPHEN 325 MG/1
650 TABLET ORAL EVERY 4 HOURS PRN
Status: ON HOLD | COMMUNITY
End: 2018-01-01 | Stop reason: HOSPADM

## 2018-01-01 RX ORDER — SODIUM CHLORIDE, SODIUM LACTATE, POTASSIUM CHLORIDE, CALCIUM CHLORIDE 600; 310; 30; 20 MG/100ML; MG/100ML; MG/100ML; MG/100ML
INJECTION, SOLUTION INTRAVENOUS CONTINUOUS
Status: DISCONTINUED | OUTPATIENT
Start: 2018-01-01 | End: 2018-01-01

## 2018-01-01 RX ORDER — QUETIAPINE FUMARATE 25 MG/1
25 TABLET, FILM COATED ORAL 2 TIMES DAILY
Status: DISCONTINUED | OUTPATIENT
Start: 2018-01-01 | End: 2018-01-01

## 2018-01-01 RX ORDER — AMLODIPINE BESYLATE 5 MG/1
5 TABLET ORAL DAILY
Status: ON HOLD | COMMUNITY
End: 2018-01-01 | Stop reason: HOSPADM

## 2018-01-01 RX ORDER — IPRATROPIUM BROMIDE AND ALBUTEROL SULFATE 2.5; .5 MG/3ML; MG/3ML
1 SOLUTION RESPIRATORY (INHALATION) ONCE
Status: COMPLETED | OUTPATIENT
Start: 2018-01-01 | End: 2018-01-01

## 2018-01-01 RX ORDER — ATROPINE SULFATE 10 MG/ML
2 SOLUTION/ DROPS OPHTHALMIC EVERY 4 HOURS PRN
Status: DISCONTINUED | OUTPATIENT
Start: 2018-01-01 | End: 2018-01-01

## 2018-01-01 RX ORDER — LORAZEPAM 2 MG/ML
2 INJECTION INTRAMUSCULAR
Status: DISCONTINUED | OUTPATIENT
Start: 2018-01-01 | End: 2018-01-01

## 2018-01-01 RX ORDER — SODIUM CHLORIDE 0.9 % (FLUSH) 0.9 %
10 SYRINGE (ML) INJECTION PRN
Status: DISCONTINUED | OUTPATIENT
Start: 2018-01-01 | End: 2018-01-01 | Stop reason: SDUPTHER

## 2018-01-01 RX ORDER — DEXTROSE MONOHYDRATE 50 MG/ML
100 INJECTION, SOLUTION INTRAVENOUS PRN
Status: DISCONTINUED | OUTPATIENT
Start: 2018-01-01 | End: 2018-01-01

## 2018-01-01 RX ORDER — ALBUTEROL SULFATE 2.5 MG/3ML
2.5 SOLUTION RESPIRATORY (INHALATION) 4 TIMES DAILY
Status: DISCONTINUED | OUTPATIENT
Start: 2018-01-01 | End: 2018-01-01

## 2018-01-01 RX ORDER — BUDESONIDE 0.25 MG/2ML
0.25 INHALANT ORAL 2 TIMES DAILY
Status: DISCONTINUED | OUTPATIENT
Start: 2018-01-01 | End: 2018-01-01

## 2018-01-01 RX ORDER — LORAZEPAM 2 MG/ML
1 INJECTION INTRAMUSCULAR EVERY 6 HOURS PRN
Status: DISCONTINUED | OUTPATIENT
Start: 2018-01-01 | End: 2018-01-01

## 2018-01-01 RX ORDER — SODIUM CHLORIDE 0.9 % (FLUSH) 0.9 %
10 SYRINGE (ML) INJECTION EVERY 12 HOURS SCHEDULED
Status: DISCONTINUED | OUTPATIENT
Start: 2018-01-01 | End: 2018-01-01 | Stop reason: SDUPTHER

## 2018-01-01 RX ORDER — SODIUM CHLORIDE, SODIUM LACTATE, POTASSIUM CHLORIDE, AND CALCIUM CHLORIDE .6; .31; .03; .02 G/100ML; G/100ML; G/100ML; G/100ML
1000 INJECTION, SOLUTION INTRAVENOUS PRN
Status: DISCONTINUED | OUTPATIENT
Start: 2018-01-01 | End: 2018-01-01

## 2018-01-01 RX ORDER — ACETAMINOPHEN 325 MG/1
650 TABLET ORAL EVERY 4 HOURS PRN
Status: DISCONTINUED | OUTPATIENT
Start: 2018-01-01 | End: 2018-01-01

## 2018-01-01 RX ADMIN — MORPHINE SULFATE 5 MG: 100 SOLUTION ORAL at 05:50

## 2018-01-01 RX ADMIN — BUDESONIDE 250 MCG: 0.25 SUSPENSION RESPIRATORY (INHALATION) at 20:40

## 2018-01-01 RX ADMIN — SODIUM CHLORIDE: 9 INJECTION, SOLUTION INTRAVENOUS at 16:47

## 2018-01-01 RX ADMIN — SODIUM CHLORIDE: 0.9 INJECTION, SOLUTION INTRAVENOUS at 16:55

## 2018-01-01 RX ADMIN — Medication 100 MG: at 08:29

## 2018-01-01 RX ADMIN — BUDESONIDE 250 MCG: 0.25 SUSPENSION RESPIRATORY (INHALATION) at 08:07

## 2018-01-01 RX ADMIN — CASTOR OIL AND BALSAM, PERU: 788; 87 OINTMENT TOPICAL at 22:52

## 2018-01-01 RX ADMIN — MORPHINE SULFATE 2.5 MG: 100 SOLUTION ORAL at 13:29

## 2018-01-01 RX ADMIN — APIXABAN 5 MG: 5 TABLET, FILM COATED ORAL at 01:31

## 2018-01-01 RX ADMIN — ATROPINE SULFATE 2 DROP: 10 SOLUTION/ DROPS OPHTHALMIC at 11:41

## 2018-01-01 RX ADMIN — SODIUM CHLORIDE 1000 ML: 9 INJECTION, SOLUTION INTRAVENOUS at 05:11

## 2018-01-01 RX ADMIN — SODIUM CHLORIDE: 9 INJECTION, SOLUTION INTRAVENOUS at 06:48

## 2018-01-01 RX ADMIN — Medication 250 MG: at 06:50

## 2018-01-01 RX ADMIN — MORPHINE SULFATE 5 MG: 100 SOLUTION ORAL at 18:42

## 2018-01-01 RX ADMIN — Medication 1 MG: at 13:29

## 2018-01-01 RX ADMIN — ACETAMINOPHEN 650 MG: 325 TABLET ORAL at 01:15

## 2018-01-01 RX ADMIN — ATROPINE SULFATE 2 DROP: 10 SOLUTION/ DROPS OPHTHALMIC at 07:01

## 2018-01-01 RX ADMIN — TAMSULOSIN HYDROCHLORIDE 0.4 MG: 0.4 CAPSULE ORAL at 08:29

## 2018-01-01 RX ADMIN — VANCOMYCIN HYDROCHLORIDE 1000 MG: 10 INJECTION, POWDER, LYOPHILIZED, FOR SOLUTION INTRAVENOUS at 18:44

## 2018-01-01 RX ADMIN — MAGNESIUM SULFATE HEPTAHYDRATE 2 G: 40 INJECTION, SOLUTION INTRAVENOUS at 09:33

## 2018-01-01 RX ADMIN — SODIUM CHLORIDE 500 ML: 9 INJECTION, SOLUTION INTRAVENOUS at 16:51

## 2018-01-01 RX ADMIN — PANTOPRAZOLE SODIUM 40 MG: 40 TABLET, DELAYED RELEASE ORAL at 06:39

## 2018-01-01 RX ADMIN — SODIUM CHLORIDE 1000 ML: 9 INJECTION, SOLUTION INTRAVENOUS at 22:45

## 2018-01-01 RX ADMIN — MORPHINE SULFATE 5 MG: 100 SOLUTION ORAL at 08:38

## 2018-01-01 RX ADMIN — SODIUM CHLORIDE 500 ML: 9 INJECTION, SOLUTION INTRAVENOUS at 22:06

## 2018-01-01 RX ADMIN — Medication 1 MG: at 12:16

## 2018-01-01 RX ADMIN — SODIUM CHLORIDE 1000 ML: 0.9 INJECTION, SOLUTION INTRAVENOUS at 19:34

## 2018-01-01 RX ADMIN — MORPHINE SULFATE 5 MG: 100 SOLUTION ORAL at 02:04

## 2018-01-01 RX ADMIN — TAMSULOSIN HYDROCHLORIDE 0.4 MG: 0.4 CAPSULE ORAL at 14:56

## 2018-01-01 RX ADMIN — Medication 1 MG: at 20:13

## 2018-01-01 RX ADMIN — POTASSIUM CHLORIDE 20 MEQ: 1.5 POWDER, FOR SOLUTION ORAL at 09:22

## 2018-01-01 RX ADMIN — CASTOR OIL AND BALSAM, PERU: 788; 87 OINTMENT TOPICAL at 09:42

## 2018-01-01 RX ADMIN — LORAZEPAM 2 MG: 1 TABLET ORAL at 16:47

## 2018-01-01 RX ADMIN — SODIUM CHLORIDE, POTASSIUM CHLORIDE, SODIUM LACTATE AND CALCIUM CHLORIDE: 600; 310; 30; 20 INJECTION, SOLUTION INTRAVENOUS at 09:27

## 2018-01-01 RX ADMIN — MORPHINE SULFATE 5 MG: 100 SOLUTION ORAL at 03:19

## 2018-01-01 RX ADMIN — ATROPINE SULFATE 2 DROP: 10 SOLUTION/ DROPS OPHTHALMIC at 04:41

## 2018-01-01 RX ADMIN — Medication 10 ML: at 22:45

## 2018-01-01 RX ADMIN — MORPHINE SULFATE 5 MG: 100 SOLUTION ORAL at 09:27

## 2018-01-01 RX ADMIN — SODIUM CHLORIDE: 9 INJECTION, SOLUTION INTRAVENOUS at 21:03

## 2018-01-01 RX ADMIN — Medication 1 MG: at 00:44

## 2018-01-01 RX ADMIN — MORPHINE SULFATE 5 MG: 100 SOLUTION ORAL at 20:13

## 2018-01-01 RX ADMIN — MORPHINE SULFATE 5 MG: 100 SOLUTION ORAL at 16:10

## 2018-01-01 RX ADMIN — Medication 1 MG: at 03:18

## 2018-01-01 RX ADMIN — ATROPINE SULFATE 1 DROP: 10 SOLUTION/ DROPS OPHTHALMIC at 13:32

## 2018-01-01 RX ADMIN — PIPERACILLIN SODIUM AND TAZOBACTAM SODIUM 3.38 G: 3; .375 INJECTION, POWDER, LYOPHILIZED, FOR SOLUTION INTRAVENOUS at 06:50

## 2018-01-01 RX ADMIN — ATROPINE SULFATE 2 DROP: 10 SOLUTION/ DROPS OPHTHALMIC at 00:37

## 2018-01-01 RX ADMIN — ALBUTEROL SULFATE 2.5 MG: 2.5 SOLUTION RESPIRATORY (INHALATION) at 07:21

## 2018-01-01 RX ADMIN — SODIUM CHLORIDE 500 ML: 9 INJECTION, SOLUTION INTRAVENOUS at 08:30

## 2018-01-01 RX ADMIN — IOHEXOL 50 ML: 240 INJECTION, SOLUTION INTRATHECAL; INTRAVASCULAR; INTRAVENOUS; ORAL at 19:11

## 2018-01-01 RX ADMIN — HYOSCYAMINE SULFATE 125 MCG: 0.12 TABLET, ORALLY DISINTEGRATING ORAL at 06:54

## 2018-01-01 RX ADMIN — Medication 100 MG: at 16:48

## 2018-01-01 RX ADMIN — SODIUM CHLORIDE: 9 INJECTION, SOLUTION INTRAVENOUS at 02:57

## 2018-01-01 RX ADMIN — IPRATROPIUM BROMIDE AND ALBUTEROL SULFATE 1 AMPULE: .5; 3 SOLUTION RESPIRATORY (INHALATION) at 17:15

## 2018-01-01 RX ADMIN — METRONIDAZOLE 500 MG: 500 INJECTION, SOLUTION INTRAVENOUS at 05:32

## 2018-01-01 RX ADMIN — MORPHINE SULFATE 5 MG: 100 SOLUTION ORAL at 04:39

## 2018-01-01 RX ADMIN — BUDESONIDE 500 MCG: 0.5 SUSPENSION RESPIRATORY (INHALATION) at 07:22

## 2018-01-01 RX ADMIN — ATROPINE SULFATE 2 DROP: 10 SOLUTION/ DROPS OPHTHALMIC at 22:53

## 2018-01-01 RX ADMIN — APIXABAN 5 MG: 5 TABLET, FILM COATED ORAL at 08:26

## 2018-01-01 RX ADMIN — MORPHINE SULFATE 5 MG: 100 SOLUTION ORAL at 04:19

## 2018-01-01 RX ADMIN — SODIUM BICARBONATE: 84 INJECTION, SOLUTION INTRAVENOUS at 11:20

## 2018-01-01 RX ADMIN — ATROPINE SULFATE 2 DROP: 10 SOLUTION/ DROPS OPHTHALMIC at 10:59

## 2018-01-01 RX ADMIN — CASTOR OIL AND BALSAM, PERU: 788; 87 OINTMENT TOPICAL at 22:40

## 2018-01-01 RX ADMIN — Medication 1 MG: at 06:35

## 2018-01-01 RX ADMIN — ATORVASTATIN CALCIUM 40 MG: 40 TABLET, FILM COATED ORAL at 01:33

## 2018-01-01 RX ADMIN — SERTRALINE HYDROCHLORIDE 75 MG: 50 TABLET ORAL at 08:29

## 2018-01-01 RX ADMIN — ATROPINE SULFATE 2 DROP: 10 SOLUTION/ DROPS OPHTHALMIC at 05:49

## 2018-01-01 RX ADMIN — MORPHINE SULFATE 5 MG: 100 SOLUTION ORAL at 01:12

## 2018-01-01 RX ADMIN — Medication 1 MG: at 20:11

## 2018-01-01 RX ADMIN — POTASSIUM CHLORIDE 40 MEQ: 20 TABLET, EXTENDED RELEASE ORAL at 01:34

## 2018-01-01 RX ADMIN — Medication 1 MG: at 08:40

## 2018-01-01 RX ADMIN — ALBUTEROL SULFATE 2.5 MG: 2.5 SOLUTION RESPIRATORY (INHALATION) at 07:54

## 2018-01-01 RX ADMIN — ALBUTEROL SULFATE 2.5 MG: 2.5 SOLUTION RESPIRATORY (INHALATION) at 11:50

## 2018-01-01 RX ADMIN — Medication 250 MG: at 00:46

## 2018-01-01 RX ADMIN — ATROPINE SULFATE 2 DROP: 10 SOLUTION/ DROPS OPHTHALMIC at 16:17

## 2018-01-01 RX ADMIN — Medication 1 MG: at 05:49

## 2018-01-01 RX ADMIN — Medication 1 MG: at 22:49

## 2018-01-01 RX ADMIN — PIPERACILLIN SODIUM AND TAZOBACTAM SODIUM 3.38 G: 3; .375 INJECTION, POWDER, LYOPHILIZED, FOR SOLUTION INTRAVENOUS at 14:56

## 2018-01-01 RX ADMIN — APIXABAN 5 MG: 5 TABLET, FILM COATED ORAL at 08:29

## 2018-01-01 RX ADMIN — MORPHINE SULFATE 5 MG: 100 SOLUTION ORAL at 00:44

## 2018-01-01 RX ADMIN — MORPHINE SULFATE 5 MG: 100 SOLUTION ORAL at 16:17

## 2018-01-01 RX ADMIN — MORPHINE SULFATE 5 MG: 100 SOLUTION ORAL at 22:53

## 2018-01-01 RX ADMIN — LEVALBUTEROL HYDROCHLORIDE 1.25 MG: 1.25 SOLUTION, CONCENTRATE RESPIRATORY (INHALATION) at 20:41

## 2018-01-01 RX ADMIN — Medication 1 MG: at 03:33

## 2018-01-01 RX ADMIN — MORPHINE SULFATE 5 MG: 100 SOLUTION ORAL at 12:17

## 2018-01-01 RX ADMIN — MORPHINE SULFATE 5 MG: 100 SOLUTION ORAL at 20:07

## 2018-01-01 RX ADMIN — LORAZEPAM 1 MG: 1 TABLET ORAL at 17:59

## 2018-01-01 RX ADMIN — MORPHINE SULFATE 5 MG: 100 SOLUTION ORAL at 22:49

## 2018-01-01 RX ADMIN — Medication 1 MG: at 00:24

## 2018-01-01 RX ADMIN — PIPERACILLIN SODIUM AND TAZOBACTAM SODIUM 3.38 G: 3; .375 INJECTION, POWDER, LYOPHILIZED, FOR SOLUTION INTRAVENOUS at 23:05

## 2018-01-01 RX ADMIN — QUETIAPINE FUMARATE 25 MG: 25 TABLET ORAL at 01:23

## 2018-01-01 RX ADMIN — Medication 1 MG: at 09:42

## 2018-01-01 RX ADMIN — MORPHINE SULFATE 5 MG: 100 SOLUTION ORAL at 13:03

## 2018-01-01 RX ADMIN — PIPERACILLIN SODIUM,TAZOBACTAM SODIUM 3.38 G: 3; .375 INJECTION, POWDER, FOR SOLUTION INTRAVENOUS at 18:06

## 2018-01-01 RX ADMIN — CASTOR OIL AND BALSAM, PERU: 788; 87 OINTMENT TOPICAL at 00:24

## 2018-01-01 RX ADMIN — MORPHINE SULFATE 5 MG: 100 SOLUTION ORAL at 21:14

## 2018-01-01 RX ADMIN — ATROPINE SULFATE 2 DROP: 10 SOLUTION/ DROPS OPHTHALMIC at 22:49

## 2018-01-01 RX ADMIN — SODIUM CHLORIDE 1000 ML: 9 INJECTION, SOLUTION INTRAVENOUS at 02:00

## 2018-01-01 RX ADMIN — HYOSCYAMINE SULFATE 125 MCG: 0.12 TABLET, ORALLY DISINTEGRATING ORAL at 08:20

## 2018-01-01 RX ADMIN — MORPHINE SULFATE 5 MG: 100 SOLUTION ORAL at 09:43

## 2018-01-01 RX ADMIN — Medication 1 MG: at 08:20

## 2018-01-01 RX ADMIN — HYOSCYAMINE SULFATE 125 MCG: 0.12 TABLET, ORALLY DISINTEGRATING ORAL at 02:49

## 2018-01-01 RX ADMIN — CASTOR OIL AND BALSAM, PERU: 788; 87 OINTMENT TOPICAL at 13:10

## 2018-01-01 RX ADMIN — METRONIDAZOLE 500 MG: 500 INJECTION, SOLUTION INTRAVENOUS at 22:20

## 2018-01-01 RX ADMIN — ATROPINE SULFATE 2 DROP: 10 SOLUTION/ DROPS OPHTHALMIC at 14:25

## 2018-01-01 RX ADMIN — SODIUM CHLORIDE 1000 ML: 9 INJECTION, SOLUTION INTRAVENOUS at 20:18

## 2018-01-01 RX ADMIN — SERTRALINE HYDROCHLORIDE 75 MG: 50 TABLET ORAL at 08:27

## 2018-01-01 RX ADMIN — MORPHINE SULFATE 5 MG: 100 SOLUTION ORAL at 10:42

## 2018-01-01 RX ADMIN — ATROPINE SULFATE 2 DROP: 10 SOLUTION/ DROPS OPHTHALMIC at 09:06

## 2018-01-01 RX ADMIN — QUETIAPINE FUMARATE 25 MG: 25 TABLET ORAL at 08:29

## 2018-01-01 RX ADMIN — ALBUTEROL SULFATE 2.5 MG: 2.5 SOLUTION RESPIRATORY (INHALATION) at 15:09

## 2018-01-01 RX ADMIN — Medication 125 MG: at 17:54

## 2018-01-01 RX ADMIN — ALBUTEROL SULFATE 2.5 MG: 2.5 SOLUTION RESPIRATORY (INHALATION) at 11:07

## 2018-01-01 RX ADMIN — QUETIAPINE FUMARATE 25 MG: 25 TABLET ORAL at 08:27

## 2018-01-01 RX ADMIN — INSULIN LISPRO 1 UNITS: 100 INJECTION, SOLUTION INTRAVENOUS; SUBCUTANEOUS at 03:43

## 2018-01-01 ASSESSMENT — PAIN SCALES - PAIN ASSESSMENT IN ADVANCED DEMENTIA (PAINAD)
BREATHING: 1
CONSOLABILITY: 0
NEGVOCALIZATION: 0
TOTALSCORE: 2
FACIALEXPRESSION: 0
TOTALSCORE: 1
NEGVOCALIZATION: 0
TOTALSCORE: 2
CONSOLABILITY: 0
BODYLANGUAGE: 1
BREATHING: 0
NEGVOCALIZATION: 0
TOTALSCORE: 2
CONSOLABILITY: 0
BODYLANGUAGE: 0
NEGVOCALIZATION: 1
FACIALEXPRESSION: 0
NEGVOCALIZATION: 1
BREATHING: 0
TOTALSCORE: 0
NEGVOCALIZATION: 1
CONSOLABILITY: 0
FACIALEXPRESSION: 0
BODYLANGUAGE: 0
NEGVOCALIZATION: 1
NEGVOCALIZATION: 0
BODYLANGUAGE: 0
BODYLANGUAGE: 0
TOTALSCORE: 1
BREATHING: 1
FACIALEXPRESSION: 0
CONSOLABILITY: 0
NEGVOCALIZATION: 1
TOTALSCORE: 1
BREATHING: 1
BODYLANGUAGE: 0
FACIALEXPRESSION: 0
CONSOLABILITY: 0
NEGVOCALIZATION: 0
CONSOLABILITY: 0
TOTALSCORE: 1
BODYLANGUAGE: 0
BODYLANGUAGE: 0
CONSOLABILITY: 0
TOTALSCORE: 2
BODYLANGUAGE: 0
BODYLANGUAGE: 0
TOTALSCORE: 1
BODYLANGUAGE: 0
CONSOLABILITY: 0
TOTALSCORE: 1
FACIALEXPRESSION: 0
NEGVOCALIZATION: 0
BREATHING: 0
FACIALEXPRESSION: 0
CONSOLABILITY: 1
NEGVOCALIZATION: 1
BREATHING: 1
TOTALSCORE: 2
CONSOLABILITY: 0
BODYLANGUAGE: 0
CONSOLABILITY: 0
BODYLANGUAGE: 0
TOTALSCORE: 1
TOTALSCORE: 0
FACIALEXPRESSION: 0
CONSOLABILITY: 0
FACIALEXPRESSION: 0
BODYLANGUAGE: 0
CONSOLABILITY: 0
NEGVOCALIZATION: 0
BREATHING: 1
TOTALSCORE: 2
BREATHING: 1
BREATHING: 1
CONSOLABILITY: 0
NEGVOCALIZATION: 0
FACIALEXPRESSION: 2
TOTALSCORE: 5
NEGVOCALIZATION: 0
BODYLANGUAGE: 0
BODYLANGUAGE: 0
BREATHING: 1
BREATHING: 1
BODYLANGUAGE: 0
NEGVOCALIZATION: 1
TOTALSCORE: 2
CONSOLABILITY: 0
NEGVOCALIZATION: 0
NEGVOCALIZATION: 0
FACIALEXPRESSION: 0
FACIALEXPRESSION: 0
BREATHING: 1
FACIALEXPRESSION: 0
FACIALEXPRESSION: 0
TOTALSCORE: 1
BREATHING: 1
BODYLANGUAGE: 0
FACIALEXPRESSION: 0
CONSOLABILITY: 0
BODYLANGUAGE: 0
BODYLANGUAGE: 0
TOTALSCORE: 2
BREATHING: 1
BREATHING: 0
BREATHING: 0
FACIALEXPRESSION: 0
BREATHING: 2
CONSOLABILITY: 0
NEGVOCALIZATION: 1
BREATHING: 0
BREATHING: 1
BREATHING: 1
CONSOLABILITY: 0
CONSOLABILITY: 0
TOTALSCORE: 5
NEGVOCALIZATION: 0
CONSOLABILITY: 0
FACIALEXPRESSION: 0
BREATHING: 2
BODYLANGUAGE: 0
BREATHING: 0
BREATHING: 1
BREATHING: 1
CONSOLABILITY: 0
BODYLANGUAGE: 0
CONSOLABILITY: 1
BREATHING: 1
TOTALSCORE: 1
BREATHING: 0
BREATHING: 2
CONSOLABILITY: 0
NEGVOCALIZATION: 1
NEGVOCALIZATION: 0
TOTALSCORE: 0
TOTALSCORE: 1
BREATHING: 2
FACIALEXPRESSION: 0
BREATHING: 1
TOTALSCORE: 0
CONSOLABILITY: 1
CONSOLABILITY: 0
NEGVOCALIZATION: 1
CONSOLABILITY: 0
BODYLANGUAGE: 0
FACIALEXPRESSION: 0
FACIALEXPRESSION: 0
NEGVOCALIZATION: 0
FACIALEXPRESSION: 0
CONSOLABILITY: 0
TOTALSCORE: 1
NEGVOCALIZATION: 0
NEGVOCALIZATION: 0
BODYLANGUAGE: 1
NEGVOCALIZATION: 0
BREATHING: 2
BODYLANGUAGE: 0
TOTALSCORE: 0
FACIALEXPRESSION: 0
BODYLANGUAGE: 1
BODYLANGUAGE: 0
NEGVOCALIZATION: 0
TOTALSCORE: 1
FACIALEXPRESSION: 1
BREATHING: 1
TOTALSCORE: 6
BODYLANGUAGE: 0
NEGVOCALIZATION: 0
BODYLANGUAGE: 0
CONSOLABILITY: 0
FACIALEXPRESSION: 0
BODYLANGUAGE: 0
BREATHING: 0
CONSOLABILITY: 0
CONSOLABILITY: 0
FACIALEXPRESSION: 1
BREATHING: 0
BREATHING: 1
CONSOLABILITY: 0
TOTALSCORE: 0
FACIALEXPRESSION: 0
TOTALSCORE: 1
CONSOLABILITY: 0
FACIALEXPRESSION: 0
TOTALSCORE: 1
FACIALEXPRESSION: 0
TOTALSCORE: 0
BODYLANGUAGE: 0
FACIALEXPRESSION: 0
BODYLANGUAGE: 0
NEGVOCALIZATION: 0
NEGVOCALIZATION: 0
BREATHING: 2
BODYLANGUAGE: 0
TOTALSCORE: 2
TOTALSCORE: 0
NEGVOCALIZATION: 0
NEGVOCALIZATION: 0
FACIALEXPRESSION: 0
BODYLANGUAGE: 0
CONSOLABILITY: 0
BODYLANGUAGE: 0
FACIALEXPRESSION: 0
BODYLANGUAGE: 0
TOTALSCORE: 2
NEGVOCALIZATION: 0
BREATHING: 0
TOTALSCORE: 1
FACIALEXPRESSION: 0
BODYLANGUAGE: 0
NEGVOCALIZATION: 0
TOTALSCORE: 1
CONSOLABILITY: 0
FACIALEXPRESSION: 0

## 2018-01-01 ASSESSMENT — PAIN SCALES - GENERAL
PAINLEVEL_OUTOF10: 0
PAINLEVEL_OUTOF10: 6
PAINLEVEL_OUTOF10: 0

## 2018-05-22 PROBLEM — G93.41 METABOLIC ENCEPHALOPATHY: Status: ACTIVE | Noted: 2018-01-01

## 2018-05-22 PROBLEM — E87.1 CHRONIC HYPONATREMIA: Status: ACTIVE | Noted: 2018-01-01

## 2018-05-23 PROBLEM — F10.10 ALCOHOL ABUSE: Status: ACTIVE | Noted: 2018-01-01

## 2018-05-23 PROBLEM — J96.01 ACUTE RESPIRATORY FAILURE WITH HYPOXIA (HCC): Status: ACTIVE | Noted: 2018-01-01

## 2018-05-24 PROBLEM — I63.9 ACUTE CVA (CEREBROVASCULAR ACCIDENT) (HCC): Status: ACTIVE | Noted: 2018-01-01

## 2018-08-09 PROBLEM — I26.99 PULMONARY EMBOLISM AND INFARCTION (HCC): Status: ACTIVE | Noted: 2018-01-01

## 2018-08-19 PROBLEM — A41.9 SEPSIS (HCC): Status: ACTIVE | Noted: 2018-01-01

## 2018-08-19 NOTE — ED PROVIDER NOTES
[address] [facility]          ATTENDING PHYSICIAN NOTE       Date of evaluation: 8/19/2018    Chief Complaint     Shortness of Breath      History of Present Illness     Madina Rhodes is a 80 y.o. male who presents With shortness of breath. Patient has a history of Alzheimer's and currently is at a psychiatric facility. There is a apparently did a white count which was 28,000 and then he was given IV Rocephin and Levaquin. He was then sent here for further evaluation patient's very hard of hearing and also demented and I'm unable to get a good history from him he complains of pain all over    Review of Systems     Review of Systems   Unable to perform ROS: Dementia       Past Medical, Surgical, Family, and Social History     He has a past medical history of Alzheimer's dementia; Anxiety disorder; Arthritis; Cerebral artery occlusion with cerebral infarction ([address] Cognitive communication deficit; Esophageal ulcer; GERD (gastroesophageal reflux disease); Hard of hearing; Hematuria; Hyperlipidemia; Hypertension; Liver abscess; Muscle weakness; Psychiatric problem; and Suicidal ideations. He has a past surgical history that includes Shoulder arthroscopy; hernia repair; hernia repair; Colonoscopy; joint replacement; knee surgery; Shoulder Arthroplasty (1-6-12 [facility] Cholecystectomy, laparoscopic (8/27/12); ERCP (6/14/2013); other surgical history (6-); and other surgical history (Left, 09/26/2014). His family history includes Cancer in his brother; Heart Disease in his father, mother, and sister. He reports that he quit smoking about 64 years ago. His smoking use included Cigarettes. He smoked 1.00 pack per day. He has never used smokeless tobacco. He reports that he does not drink alcohol or use drugs.     Medications     Previous Medications    ACETAMINOPHEN (TYLENOL) 325 MG TABLET    Take 650 mg by mouth every 4

## 2018-08-20 NOTE — PROGRESS NOTES
SURGICAL HISTORY  6-    common bile duct exploration with cholangiogram and choledochoduodenostomy    OTHER SURGICAL HISTORY Left 09/26/2014    I & D Left elbow    SHOULDER ARTHROPLASTY  1-6-12 LEFT SHOULDER REVERSE ARTHROPLASTY, BICEPS TENODESIS DEPUY    SHOULDER ARTHROSCOPY      left       Level of Consciousness: Disoriented and Uncooperative = 2    Level of Activity: Mostly sedentary, minimal walking = 2    Respiratory Pattern: Regular Pattern; RR 8-20 = 0    Breath Sounds: Clear = 0    Sputum   ,  ,    Cough: Strong, spontaneous, non-productive = 0    Vital Signs   BP (!) 81/51   Pulse 96   Temp 97.8 °F (36.6 °C) (Axillary)   Resp 26   Ht 5' 8\" (1.727 m)   Wt 153 lb (69.4 kg)   SpO2 96%   BMI 23.26 kg/m²   SPO2 (COPD values may differ): 90-91% on room air or greater than 92% on FiO2 24- 28% = 1    Peak Flow (asthma only): not applicable = 0    RSI: 5-6 = Q4hr PRN (every four hours as needed) for dyspnea        Plan       Goals: medication delivery and improve oxygenation    Patient/caregiver was educated on the proper method of use for Respiratory Care Devices:  Yes      Level of patient/caregiver understanding able to:   [] Verbalize understanding   [] Demonstrate understanding       [] Teach back        [x] Needs reinforcement       []  No available caregiver               []  Other:     Response to education:  Poor     Is patient being placed on Home Treatment Regimen? Yes     Does the patient have everything they need prior to discharge? Yes     Comments: Patient uses [facility] at home, is on home equivalent. Plan of Care: Continue Duoneb HHN Q6HPRN, Albuterol HHN 4 times daily, Pulmicort HHN BID, O2 to keep spo2 92% or greater. Electronically signed by Jone Simmons RCP on 8/20/2018 at 1:20 AM    Respiratory Protocol Guidelines     1.  Assessment and treatment by Respiratory Therapy will be initiated for medication and therapeutic interventions upon initiation of aerosolized

## 2018-08-20 NOTE — H&P
History and Physical  PGY-2    Hospital Day: 2                                                         Code:[id number]-CC  Admit Date: 8/19/2018  4:08 PM            PCP: Jenny Be DO                                  Chief Complaint: SOB with elevated WBC    History of present illness:   Willow Parham is a 80 y.o. male w/ PMH of COPD, dementia, CVA, PE on xeralto, and untreated Renal Cell Carcinoma (dx 2 yrs ago) presenting from [address] [place] [address] with SOB and elevated WBC count. Per nursing staff at nursing home his baseline is  A&O X2. Most history was gathered from nursing staff although patient was able to answer some ROS questions. Pt was sent home from [facility] approx 1 week prior to admission, after being admitted for PE in the setting of LE DVT and was d/c on xeralto and macrobid for UTI that was incidentally found. Finished macrobid course per nursing stuff. On day of admission pt complained of SOB at rest with no cough or chest pain. Had been in his usual state of health on days leading up to admission. Staff at nursing home note that he refused to take his medications and exclaimed \" I just want to die\". Patient has had repeated episodes of attempted suicide and often vocalized his wishes of dying. Pt denies being in pain, SOB, nausea, vomiting and diarrhea.        ED course: Received 3L of NS and responsed well. Received Zosyn and Vanc.  CTPA neg for PE.     POA: Kevin Hassan ([phone])     Review of systems:   History obtained from the patient  ROS negative except as noted in HPI.     ============================================================================  Past medical history:  Past Medical History:   Diagnosis Date    Alzheimer's dementia     Anxiety disorder     Arthritis     Cerebral artery occlusion with cerebral infarction [facility])     unspecified sequele cerebral infarcts    Cognitive communication deficit     Esophageal ulcer     GERD (gastroesophageal reflux disease)     Hard albuterol-ipratropium (COMBIVENT RESPIMAT)  MCG/ACT AERS inhaler Inhale 1 puff into the lungs every 6 hours as needed for Wheezing 1 Inhaler 0    fluticasone-salmeterol (ADVAIR DISKUS) 100-50 MCG/DOSE diskus inhaler Inhale 1 puff into the lungs every 12 hours 60 each 3    Artificial Saliva (BIOTENE MOISTURIZING MOUTH) SOLN Take 15 mLs by mouth as needed      ferrous sulfate 325 (65 Fe) MG tablet Take 325 mg by mouth 2 times daily      loperamide (IMODIUM) 2 MG capsule Take 2 mg by mouth 4 times daily as needed for Diarrhea      Multiple Vitamins-Minerals (MULTIVITAMIN ADULT PO) Take 1 tablet by mouth daily      sertraline (ZOLOFT) 25 MG tablet Take 75 mg by mouth daily One time a day for depression give with 50 mg tablet for a 75 mg total dose      amLODIPine (NORVASC) 5 MG tablet Take 5 mg by mouth daily      acetaminophen (TYLENOL) 325 MG tablet Take 650 mg by mouth every 4 hours as needed for Pain      atorvastatin (LIPITOR) 40 MG tablet Take 1 tablet by mouth nightly (Patient taking differently: Take 20 mg by mouth nightly ) 30 tablet 3    omeprazole (PRILOSEC) 20 MG capsule Take 40 mg by mouth daily          Allergies: Allergies   Allergen Reactions    Darvon [Propoxyphene Hcl] Rash       ============================================================================       Current Vitals: BP (!) 81/51   Pulse 96   Temp 97.8 °F (36.6 °C) (Axillary)   Resp 26   Ht 5' 8\" (1.727 m)   Wt 153 lb (69.4 kg)   SpO2 96%   BMI 23.26 kg/m²     Physical Examination:     Physical Exam   General appearance: Chronically ill-appearing, alert, oriented to person and place, very thin  HEENT:  Normocephalic, atraumatic without obvious deformity. Pupils equal, round, and reactive to light. Extra ocular muscles intact. Conjunctivae/corneas clear. Neck: Supple, with full range of motion. No jugular venous distention. Trachea midline. Respiratory:  Normal respiratory effort.  Intermittent mild diffuse wheezes,

## 2018-08-20 NOTE — PROGRESS NOTES
Physical Therapy and Occupational Therapy  No Treatment    Referral received and chart reviewed. Attempted to see pt for PT/OT evaluations this afternoon. Pt leaving floor for ultrasound. Will try back later today as time and schedule allow. If not will follow up 8/21.     Steffen Betancourt, PT [place] OTR/L #[id number]

## 2018-08-20 NOTE — PROGRESS NOTES
RN updated MD: pt has recent c diff positive stool at outside facility. Per attending plan, ordered stat CT abdomen pelvis with oral contrast, ordered c diff collection, started on PO vanc. RN also notified MD of pt's PMH of suicidal ideation with alcoholism. CIWA protocol was ordered for pt. BP's after straight cath continue to be soft. Ordered 500 cc bolus NS for pt. Called both nursing home facility and [facility] regarding abdominal line in pt. Neither facility knows when it was placed, both facilities say they did not place it.      Kaykay Guan, PGY-1  [phone]

## 2018-08-20 NOTE — CARE COORDINATION
JOVANI spoke with [place] [phone] intake at [address] [place] [address] who will need to re-assess pt at [facility] before   returning to in pt psych unit. Pt was a resident at [facility] prior to admissions at [address] [place] [address]. JOVANI will continue to follow pts progress.     [facility]   [phone]

## 2018-08-20 NOTE — PROGRESS NOTES
Left lower quad with capped line access for fluids from SNF. Will consult our IV/ Picc nurse and nurse educator for policy plan, need. ..

## 2018-08-20 NOTE — PROGRESS NOTES
Pt's facility where he came from lab called + C_DIFF, will fax results. . Pt only has  Had one loose stool today and was watery couldn't send off sample  Because it didn't fit protocol, and pt hasn't gone but one time since admission.

## 2018-08-20 NOTE — PLAN OF CARE
Problem: Nutrition  Goal: Optimal nutrition therapy  Outcome: Ongoing  Nutrition Problem: Increased nutrient needs  Intervention: Food and/or Nutrient Delivery: Continue current diet, Start ONS (safest texture per SLP)  Nutritional Goals: Patient to consume >75% of meals and supplements for wound healing

## 2018-08-20 NOTE — PLAN OF CARE
Problem: Bowel/Gastric:  Goal: Occurrences of diarrhea will decrease  Occurrences of diarrhea will decrease  Outcome: Met This Shift  Just notified from pt's facility's lab of + c-diff, paged resident awaiting return call, placed in contact + isolation, per supervisor's request after confirming with ID nurse Giacomo    Problem: Physical Regulation:  Goal: Prevent transmision of infection  Prevent transmision of infection  Outcome: Met This Shift  Contact + isolation in place  Goal: Ability to avoid or minimize complications of infection will improve  Ability to avoid or minimize complications of infection will improve  Outcome: Met This Shift      Problem: Skin Integrity:  Goal: Risk for impaired skin integrity will decrease  Risk for impaired skin integrity will decrease  Outcome: Met This Shift  Know stage 2 on coccyx bilate rt > left quarter size, zinc to site turn q2hr.

## 2018-08-20 NOTE — PROGRESS NOTES
Hospitalist Progress Note      PCP: Aftab Gutierrez DO    Date of Admission: 8/19/2018    Chief Complaint: Sepsis    Hospital Course:   Patient's admitting history reviewed and confirmed  Does not feel well  Denies any chest pain however complains of some shortness of breath next line on 2 L oxygen  Blood pressure seems to have improved  No fevers  Leukocytosis improving  No diarrhea  Denies any abdominal pain          Medications:  Reviewed      Exam:    BP (!) 86/39   Pulse 104   Temp 97.4 °F (36.3 °C) (Oral)   Resp 16   Ht 5' 8\" (1.727 m)   Wt 152 lb 8.9 oz (69.2 kg)   SpO2 95%   BMI 23.20 kg/m²     General appearance: No apparent distress, appears stated age and cooperative. HEENT: Pupils equal, round, and reactive to light. Conjunctivae/corneas clear. Neck: Supple, with full range of motion. No jugular venous distention. Trachea midline. Respiratory: Decreased air entry at both lung bases  Cardiovascular: Regular rate and rhythm with normal S1/S2 without MURMURS, rubs or gallops. Abdomen: Soft, non-tender, non-distended with normal bowel sounds. Musculoskeletal: No clubbing, cyanosis or EDEMA bilaterally. Full range of motion without deformity. Skin: Skin color, texture, turgor normal.  No rashes or lesions. Neurologic:  Neurovascularly intact without any focal sensory/motor deficits.  Cranial nerves: II-XII intact, grossly non-focal.  Psychiatric: Alert and oriented, thought content appropriate, normal insight  Capillary Refill: Brisk,< 3 seconds   Peripheral Pulses: +2 palpable, equal bilaterally       Labs:   Recent Labs      08/19/18   1710  08/20/18   0529   WBC  28.2*  24.7*   HGB  10.3*  9.1*   HCT  32.7*  29.1*   PLT  289  269     Recent Labs      08/19/18   1710  08/20/18   0529   NA  135*  135*   K  3.4*  3.5   CL  104  108   CO2  16*  15*   BUN  68*  64*   CREATININE  2.8*  2.5*   CALCIUM  8.3  7.4*   PHOS   --   4.5     Recent Labs      08/19/18   1710   AST  15   ALT  18 BILITOT  0.7   ALKPHOS  203*     No results for input(s): INR in the last 72 hours. Recent Labs      08/19/18   1710   CKTOTAL  32*   TROPONINI  0.03*       Urinalysis:      Lab Results   Component Value Date    NITRU POSITIVE 08/20/2018    WBCUA 3-5 08/20/2018    BACTERIA 2+ 08/20/2018    RBCUA >100 08/20/2018    BLOODU LARGE 08/20/2018    SPECGRAV 1.020 08/20/2018    GLUCOSEU 100 08/20/2018    GLUCOSEU NEGATIVE 12/28/2011       Radiology:  XR CHEST PORTABLE   Final Result   1. No acute airspace disease. 2. Emphysema. 3. Mild scattered areas of scarring in both lungs. US RENAL COMPLETE    (Results Pending)       Assessment/Plan:    Active Hospital Problems    Diagnosis Date Noted    Sepsis ([facility] [address] [A41.9] 08/19/2018       Acute Medical Issues Being Addressed:    72-year-old gentleman known history of COPD, dementia, previous CVA, renal cell carcinoma admitted with increasing shortness of breath    Severe sepsis suspect secondary to possible UTI  Chest x-ray did not show any evidence of pneumonia  He does complain of some shortness of breath however do not see pneumonia heart failure  Currently on vancomycin and Zosyn  At this point will only continue with Zosyn  Urine cultures are pending  UA was unremarkable however he was on antibiotics  Blood cultures sent  Continue IV fluids  Blood pressure seems to have improved with fluids  Heart rate seems to improved    Acute kidney injury strongly suspect prerenal secondary to sepsis and dehydration  Creatinine up to 2.8 from baseline of 1.4  Continue IV fluids  Bladder scan  Renal ultrasound  Strict intake and outputs  Condom catheter    Known COPD  At this time there is no clear clinical evidence of COPD exacerbation  Will continue to monitor    Hypertension  On Norvasc and Benzapril on hold    Borderline diabetes  Continue sliding scale insulin              DVT Prophylaxis: Subcutaneous heparin  Diet: DIET CARB CONTROL;   Dietary Nutrition Supplements:

## 2018-08-20 NOTE — H&P
MD   amLODIPine (NORVASC) 5 MG tablet Take 5 mg by mouth daily    Historical Provider, MD   acetaminophen (TYLENOL) 325 MG tablet Take 650 mg by mouth every 4 hours as needed for Pain    Historical Provider, MD   atorvastatin (LIPITOR) 40 MG tablet Take 1 tablet by mouth nightly  Patient taking differently: Take 20 mg by mouth nightly  5/27/18   Azael Silva MD   omeprazole (PRILOSEC) 20 MG capsule Take 40 mg by mouth daily     Historical Provider, MD       Allergies:  Darvon [propoxyphene hcl]    Social History:      Resident at [facility]. Keeps in contact with nephew who has POA. Served in the [employer]. TOBACCO:   reports that he quit smoking about 64 years ago. His smoking use included Cigarettes. He smoked 1.00 pack per day. He has never used smokeless tobacco.  ETOH:   reports that he does not drink alcohol. Family History:      *** Reviewed in detail and negative for DM, CAD, Cancer, CVA. Positive as follows:    Family History   Problem Relation Age of Onset    Heart Disease Mother     Heart Disease Father     Heart Disease Sister     Cancer Brother         lung       REVIEW OF SYSTEMS:   Pertinent positives as noted in the HPI. All other systems reviewed and negative. ROS: ROS    PHYSICAL EXAM PERFORMED:    BP 85/66   Pulse 98   Temp 98 °F (36.7 °C) (Axillary)   Resp 20   Wt 150 lb (68 kg)   SpO2 96%   BMI 22.81 kg/m²     General appearance: Sick appearing but tracks medical staff during encounter. HEENT:  Normal cephalic, atraumatic without obvious deformity. Pupils equal, round, and reactive to light. Extra ocular muscles intact. Conjunctivae/corneas clear. Neck: Supple, with full range of motion. No jugular venous distention. Trachea midline. Respiratory:  Normal respiratory effort. Clear to auscultation, bilaterally without Rales/Wheezes/Rhonchi. Cardiovascular:  Regular rate and rhythm with normal S1/S2 without murmurs, rubs or gallops.   Abdomen: Soft, non-tender, non-distended with normal bowel sounds. Skin: Skin color, texture, decreased skin tugor. No rashes or lesions. [facility]:  Alert and oriented X2 (baseline). Oriented only to place and person. Capillary Refill: Slow,>2 seconds     Labs:     Recent Labs      08/19/18   1710   WBC  28.2*   HGB  10.3*   HCT  32.7*   PLT  289     Recent Labs      08/19/18 1710   NA  135*   K  3.4*   CL  104   CO2  16*   BUN  68*   CREATININE  2.8*   CALCIUM  8.3     Recent Labs      08/19/18   1710   AST  15   ALT  18   BILITOT  0.7   ALKPHOS  203*     No results for input(s): INR in the last 72 hours. Recent Labs      08/19/18 1710   CKTOTAL  32*   TROPONINI  0.03*       Urinalysis:      Lab Results   Component Value Date    NITRU POSITIVE 08/19/2018    WBCUA 0-2 08/19/2018    BACTERIA 2+ 08/19/2018    RBCUA >100 08/19/2018    BLOODU LARGE 08/19/2018    SPECGRAV 1.025 08/19/2018    GLUCOSEU Negative 08/19/2018    GLUCOSEU NEGATIVE 12/28/2011       Radiology:     CXR: I have reviewed the CXR with the following interpretation: ***  EKG:  I have reviewed the EKG with the following interpretation: ***    XR CHEST PORTABLE   Final Result   1. No acute airspace disease. 2. Emphysema. 3. Mild scattered areas of scarring in both lungs. ASSESSMENT & PLAN:    79 YO M w/ PMH of COPD, dementia, CVA, and Renal Cell Carcinoma presenting from [address] [place] [address] with SOB and elevated WBC count. Sepsis (with suspicion of UTI). Elevated WBC and with nitrite positive UA. Meets SIRS (3/4)  -IV vancomycin PTD  -Continue zosyn  -Follow blood cultures    -Follow urine culture   -30 ml/kg fluid resuscitation     EMILY likely prerenal in setting of dehydration. Cr 2.8 on admission. Baseline 1.4  -s/p 3L IVF.  Last echo on 5/2018 no evidence of HF.   -Monitor Cr    Hypotension: BP 80/54 on arrival to ED  -Responded well to 3L of NS will continue to resuciate his hypovolemic status     COPD  -Keep SpO2 at 92%  -Duoneb    HLD  -Continue home 40mg atorvastatin qhs    Hx of HTN  -Hold home Norvasc 5mg and Benazepril 10mg    Prediabetes   -Last A1C was 6.9.  Blood glucose in ED was 180  -Continue with diet control and sliding scale insulin    DVT Prophylaxis: Eliquis   Diet:  Low carb diet   Code Status: [facility]    PT/OT Eval Status: ***    Dispo - ***       Erik Lindsey    I will discuss the patient with the senior resident and MD Nisa Stevenson MD/DO  Internal Medicine Resident, PGY-***  Pager: [phone]

## 2018-08-20 NOTE — PROGRESS NOTES
Supervisor Lexii Rogers here to look at [facility] [place] access in abd. , and aware of C-diff + report from facility.

## 2018-08-20 NOTE — PROGRESS NOTES
Resident returned page up-dated on past notes in chart, and pt.'s restlessness. She will place new orders. She also called both facilities pt was at recently and all deny knowing about  the access in his abd.

## 2018-08-20 NOTE — PLAN OF CARE
Problem: Falls - Risk of:  Goal: Will remain free from falls  Will remain free from falls   Outcome: Met This Shift  Side rails up c 2 call light in reach bed in low position bed/chair al;rm on when in use    Problem: Pain:  Goal: Pain level will decrease  Pain level will decrease   Outcome: Met This Shift  Denies c/o pain at present    Problem: Risk for Impaired Skin Integrity  Goal: Tissue integrity - skin and mucous membranes  Structural intactness and normal physiological function of skin and  mucous membranes.    Outcome: Ongoing  Stage 2  X 2 on coccyx , skin assessment , consult place awaiting tx plan    Problem: Tissue Perfusion, Altered:  Goal: Circulatory function within specified parameters  Circulatory function within specified parameters   Outcome: Met This Shift  vss , BC pending on iv antibiotics

## 2018-08-20 NOTE — PROGRESS NOTES
last 72 hours. U/A:  Recent Labs      08/20/18   0217   COLORU  BROWN*   PHUR  6.5   WBCUA  3-5   RBCUA  >100*   BACTERIA  2+*   CLARITYU  CLOUDY*   SPECGRAV  1.020   LEUKOCYTESUR  TRACE*   UROBILINOGEN  1.0   BILIRUBINUR  SMALL*   BLOODU  LARGE*   GLUCOSEU  100*       XR CHEST PORTABLE   Final Result   1. No acute airspace disease. 2. Emphysema. 3. Mild scattered areas of scarring in both lungs. US RENAL COMPLETE    (Results Pending)         ASSESSMENT AND PLAN:   Ciaran Crane is a 80 y.o. male w/ PMH of COPD, dementia, CVA, PE on xeralto, and untreated Renal Cell Carcinoma (dx 2 yrs ago) presenting from nursing home with SOB and elevated WBC count. He was discharged from [facility] approx 1 week prior to admission, after being admitted for PE in the setting of LE DVT and was d/c on xeralto and also found to have a UTI for which he ws discharged with [facility]anc. 1-Severe sepsis, possibly 2/2 UTI. Elevated WBC and with nitrite positive UA. Blood in UA from hx of untreated RCC. Meets SIRS (3/4) w HoTN, qSOFA 2/3. Since the UTI is the primary suspect for his condition we would recommend discontinuing his vancomycin.  -Discontinuevancomycin  -Continue zosyn  -Follow blood cultures    -Follow urine culture   -30 ml/kg fluid resuscitation followed by maintenance fluids  -Regular vitals     2-EMILY likely prerenal in setting of dehydration. Cr 2.5 this morning, 2.8 on admission. Baseline 1.4  -Postvoid bladder scan showed 350ml or urine. Condom catheter was ordered for better measurement of urine output.  -Renal US to evaluate any upper urinary tract obstruction  -Monitor Cr  -s/p 3L IVF. Last echo on 5/2018 no evidence of HF (EF 55%).      3-Hypotension BP 80/54 on arrival to ED, initially fluid responsive, but remains w BPs 80s-90s/50s. Baseline BPs from outpatient records appears to be 120s/80s.  No CVCs or pressors given DNR-CC code status.  -Continue IVF     4-Recent Hx of DVT He was recently discharged from hospital 2/2 DVT with Xarelto. Xarelto discontinued on admission and started on Eliquis. Concern for increased bleeding tendency due to EMILY.   -Continue Eliquis for today  -Follow Cr level and switch to Heparin if renal function does not improve by tomorrow    5-Non-anion gap metabolic acidosis possibly due to acute renal failure  -Follow Cr level     6-COPD - patient stopped taking home meds approx 3 days ago per SNF, which may be contributing to his SOB, although he does not seem to be in exacerbation on physical exam  -Keep SpO2 at 88-92%  -Duonebs q4h PRN  -Continue home meds     7-HLD  -Continue home 40mg atorvastatin qhs     8-Hx of HTN  -Hold home Norvasc 5mg and Benazepril 10mg given HoTn        Will discuss with attending physician     Code Status:   FEN:   PPX:   DISPO:    Kathryn Sweeney MD, PGY-1  Pager [phone]  8/20/2018,  11:26 AM

## 2018-08-20 NOTE — PROGRESS NOTES
Paging resident for parameters for b/p and ativan  Results upper 80's sb/p multiple times and to give CT report. . Pt incont of small amt of loose stool sample sent to lab for c-diff willie care zinc to  Stage 2 on coccyx; family member here to see pt.

## 2018-08-20 NOTE — CONSULTS
Clinical Pharmacy Consult Note    Admit date: 8/19/2018    Subjective/Objective: Mildred Fields is a 80 y.o. male w/ PMH of COPD, dementia, CVA, PE/DVT, and untreated Renal Cell Carcinoma (dx 2 yrs ago) presenting from SNF with SOB and elevated WBC count. Pt was discharged from [facility] on 8/9, after being admitted for PE in the setting of LE DVT and was d/c on Eliquis and macrobid for UTI. Pharmacy is consulted to dose Vancomycin per Dr. Arlene Vaughan    Pertinent Medications:    Zosyn 3.375g IV Q12H -- day #2    8/20: Vancomycin D/C'ed per Dr. Edilson Ball:  Recent Labs      08/19/18   1710  08/20/18   0529   NA  135*  135*   K  3.4*  3.5   CL  104  108   CO2  16*  15*   PHOS   --   4.5   BUN  68*  64*   CREATININE  2.8*  2.5*       Estimated Creatinine Clearance: 21 mL/min (A) (based on SCr of 2.5 mg/dL (H)). Recent Labs      08/19/18   1710  08/20/18   0529   WBC  28.2*  24.7*   HGB  10.3*  9.1*   HCT  32.7*  29.1*   MCV  83.5  86.9   PLT  289  269       Micro:  Date Site Micro Susceptibility   8/19 Blood x2     8/20 Urine               Assessment/Plan: Severe sepsis 2/2 possible UTI    1. Vancomycin - pharmacy to dose  · Chart review indicates that patient was previously on vancomycin 1g Q12H in 2014 with Scr 0.8, weight 83 kg. Trough resulted at 14.7 before the fourth dose   · Patient has an EMILY, current Scr 2.5 (admission 2.8). Scr is slightly improving, however, current half-life is estimated at 36 hours. Vancomycin D/C'ed per Dr. Markus Quintanilla, will continue Zosyn until cultures result. Pharmacy will sign off Renal function will be monitored closely and dosing will be adjusted as appropriate. Please call with any questions.     Ramya Mena, PharmD, MPH  PGY-1 Pharmacy Resident  Office: [phone]  Wireless: [phone]  8/20/2018 11:45 AM

## 2018-08-21 PROBLEM — A41.9 SEPTIC SHOCK (HCC): Status: ACTIVE | Noted: 2018-01-01

## 2018-08-21 PROBLEM — R65.21 SEPTIC SHOCK (HCC): Status: ACTIVE | Noted: 2018-01-01

## 2018-08-21 NOTE — PROGRESS NOTES
Patient not responsive, unsure of ability to swallow at this time. Resident ok for placement of NG for medication administration if needed. Attempted NG insertion to both nares, each time tube came out of mouth and patient not able to follow instructions. Patient more alert after NG attempts and was able to swallow PO Vanc. Hold on NG placement at this time.     Dae Pandya 8/21/2018

## 2018-08-21 NOTE — PROGRESS NOTES
Occupational Therapy   Occupational Therapy Initial Assessment  Date: 2018   Patient Name: Alon Bentno  MRN: [id number]     : 1934    Date of Service: 2018    Discharge Recommendations:  Alon Benton scored a 6/24 on the AM-PAC ADL Inpatient form. Current research shows that an AM-PAC score of 17 or less is typically not associated with a discharge to the patient's home setting. Based on the patients AM-PAC score and their current ADL deficits, it is recommended that the patient have 5-7 sessions per week of Occupational Therapy at d/c to increase the patients independence. OT Equipment Recommendations  Other: continue to assess      Patient Diagnosis(es): The primary encounter diagnosis was Hypotension, unspecified hypotension type. A diagnosis of Dyspnea and respiratory abnormalities was also pertinent to this visit. has a past medical history of Alzheimer's dementia; Anxiety disorder; Arthritis; Cerebral artery occlusion with cerebral infarction ([address] Cognitive communication deficit; Esophageal ulcer; GERD (gastroesophageal reflux disease); Hard of hearing; Hematuria; Hyperlipidemia; Hypertension; Liver abscess; Muscle weakness; Psychiatric problem; and Suicidal ideations. has a past surgical history that includes Shoulder arthroscopy; hernia repair; hernia repair; Colonoscopy; joint replacement; knee surgery; Shoulder Arthroplasty (12 LEFT SHOULDER REVERSE ARTHROPLASTY, BICEPS TENODESIS [facility]); Cholecystectomy, laparoscopic (12); ERCP (2013); other surgical history (2013); and other surgical history (Left, 2014). Treatment Diagnosis: impaired mobility secondary to emphysema      Restrictions  Position Activity Restriction  Other position/activity restrictions: Up in chair    Subjective   General  Chart Reviewed: Yes  Additional Pertinent Hx: 80 y.o. male who presents With shortness of breath.    Hospital Course: CXR: emphysema; PMH of COPD,

## 2018-08-21 NOTE — PROGRESS NOTES
Speech Language Pathology  Facility/Department: [place] [address] [facility]   BEDSIDE SWALLOW EVALUATION/treatment   NAME: Albert Ayoub  : 1934  MRN: [id number]    ADMISSION DATE: 2018  ADMITTING DIAGNOSIS: has Liver abscess; Acute renal failure ([address] Leukocytosis; Acute cholecystitis; Shortness of breath; Delirium; Hypoxia; Acute encephalopathy; Hypertension; Nausea; Obstructive jaundice; Abnormal LFTs; Septic olecranon bursitis; Rash; Hyponatremia; GERD (gastroesophageal reflux disease); Metabolic acidosis; Primary localized osteoarthrosis, hand; Lesion of ulnar nerve; Carpal tunnel syndrome; Leucocytoclastic vasculitis ([address] PNA (pneumonia); Respiratory failure ([address] Hyponatremia; HTN (hypertension); Weakness of right lower extremity; Chronic hyponatremia; Metabolic encephalopathy; Acute respiratory failure with hypoxia ([address] Alcohol abuse; Acute CVA (cerebrovascular accident) ([address] Arterial ischemic stroke, ICA, left, acute ([address] HTN (hypertension), benign; New onset seizure ([address] Pulmonary embolism and infarction [facility]); and Sepsis ([address] on his problem list.  ONSET DATE: 18    Recent Chest Xray 18  1. No acute airspace disease. 2. Emphysema. 3. Mild scattered areas of scarring in both lungs. CT of head 18  No acute intracranial hemorrhage or mass effect.       Stable white matter disease, nonspecific but likely chronic small vessel ischemic changes.          Date of Eval: 2018  Evaluating Therapist: Carolynn Olivier    Current Diet level:  Current Diet : Regular  Current Liquid Diet : Thin      Primary Complaint  Patient Complaint: pt did not state     Pain:  Pain Assessment  Patient Currently in Pain: Unable to Assess      Reason for Referral  Albert Ayoub was referred for a bedside swallow evaluation to assess the efficiency of his swallow function, identify signs and symptoms of aspiration and make recommendations regarding safe dietary consistencies, effective educated to purpose of the visit, anatomy and physiology of the swallow, concerns for aspiration, and rational for NPO recommendation. The family stated comprehension and were appreciative of the information. con't goal  .     General  Chart Reviewed: Yes  Behavior/Cognition: Lethargic  Respiratory Status: O2 via nasual cannula  Communication Observation: Non-verbal  Follows Directions: None  Dentition: Edentulous (has denture cup at bedside)  Patient Positioning: Upright in bed  Baseline Vocal Quality:  (did not verbalize)  Volitional Cough: Absent  Volitional Swallow: Absent  Prior Dysphagia History: pt seen in May of 2018 at [facility] following CVA. Swallowing was [facility] at that time  Consistencies Administered: Ice Chips; Nectar - teaspoon           Vision/Hearing  Vision  Vision:  (pt with eyes closed- unable to assess)  Hearing  Hearing: Exceptions to [facility]  Hearing Exceptions: Hard of hearing/hearing concerns;Bilateral hearing aid    Oral Motor Deficits   unable to assess due to lethargy    Oral Phase Dysfunction   pt did not respond when ice chip and tsp of nectar thick water were placed in his mouth     Indicators of Pharyngeal Phase Dysfunction    no swallow responds observed or felt upon palpation of anterior neck    Prognosis  Prognosis  Prognosis for safe diet advancement: fair  Barriers to reach goals: inconsistent alertness  Individuals consulted  Consulted and agree with results and recommendations: Patient;RN    Education  Patient Education: Role of SLP  Patient Education Response: No evidence of learning      G-Code  SLP G-Codes  Functional Limitations: Swallowing  Swallow Current Status (): 100 percent impaired, limited or restricted  Swallow Goal Status ():  At least 80 percent but less than 100 percent impaired, limited or restricted         Therapy Time  SLP Individual Minutes  Time In: 7350  Time Out: 3441  Minutes: 25          Pt's goal: pt unable to state       Plan:  Continue goals per POC  Recommended diet:NPO with thorough oral care with green suction swabs  Will attempt to re-assess again tomorrow (pager number left on board inpt's room for RN to page SLP if pt becomes more alert and able to participate)  Total treatment time:10dx, 15tx  Pt's discharge plan:pt unable to state  Discharge Plan:  To be determined closer to discharge  Discussed with RNCarmen present to meet pt needs       Nino Lambert, [address] [place] [address] Christina, [facility]- [address] [place] [address]  Pg # [phone]  This document will serve as a discharge summary if pt discharge before next treatment   session

## 2018-08-21 NOTE — PROGRESS NOTES
Speech Language Pathology  Assessment attempt    Referral rec'd, chart reviewed. Spoke with RN who reported pt was coughing with thin liquids yesterday. RN thickened liquids to nectar consistency then no s/s aspiration occurred. Attempted to initiate assessment this morning, but unable to wake pt. Will attempt again later. Discussed with RN.     Jm Broderick MA, Ronni Ni  Speech-Language Pathologist  Pager [phone]

## 2018-08-21 NOTE — PROGRESS NOTES
Development of mild colonic wall thickening as described above suggestive of colitis. Scattered areas of free fluid, new since prior exam.      Focal, eccentric thickening is identified in the anterior/superior bladder. This is nonspecific but neoplasm is not excluded and cystoscopy is recommended for further assessment. Mild bibasilar consolidation, probably atelectasis. US RENAL COMPLETE   Final Result      Ascites. No hydronephrosis      XR CHEST PORTABLE   Final Result   1. No acute airspace disease. 2. Emphysema. 3. Mild scattered areas of scarring in both lungs. Assessment/Plan:  Lloyd fraga 80 y. o. male w/ PMH of COPD, dementia, CVA, and renal cell carcinoma presenting from nursing home with SOB and elevated WBC count.     Severe sepsis  SIRS 3/4, qSOFA 2/3. LA 1.2. Unclear infectious source. Possibly 2/2 UTI, UA positive for nitrites and WBCs. Bcltx NGTD. Urine culture pending. Pt getting progressively more hypotensive, bolused 3L overnight and increased rate to 150cc/hr. Not eligible for pressor therapy 2/2 DNR-CC status. - IV zosyn  - f/u Bcltx, Ucltx   - on  ml/hr d5+50mg bicarb     EMILY   Cr 2.8 on admission, 2.3 today . Baseline 1.4. Unclear etiology, pt has risk factors for both prerenal and postrenal causes, may be mixed. Prerenal in setting of dehydration, sepsis. Previously had concerns for retention, now has lees. Low UO of 800cc in past 24 hours. - s/p extensive fluid resuscuitation  - Renal BID  - replete electrolytes as needed   - continue flomax 0.4 qhs    NAGMA likely 2/2 to excess NS infusion  Hypernatremic, hyperchloremic metabolic acidosis without anion gap. - switched to d5 + 50mg bicarb 150cc/hr     Pulmonary Embolism  Pt was recently dx with PE, on Eliquis. Hgb decreased from 9.1 yesterday to 7.5 this AM. May be hemodilutional in setting of aggressive fluids.  Even in setting of hematuria, benefit of AC outweighs risk of bleeding at this point.   - d/c home eliquis  - high-dose heparin gtt     Chronic Hematuria 2/2 Renal Cell Carcinoma  Pt was diagnosed with RCC, untreated. Has had hematuria stable for a while. Repeat CT scan limited by artifact, no impressions on kidneys. However noted to be focal thickening of anterior/superior bladder, non-specific but cannot r/o neoplasm.   - consult urology    Acute on chronic anemia  Hgb now 7.5 from 9.1 yesterday. Likely hemodilutional after receiving net +7L NS in past 24 hours. Pt with hematuria that is chronic.   - daily CBC     COPD, not in exacerbation   Pt with history of noncomplaince with medicine for 3 days prior to admission. No wheezing on exam. SOB likely multifactorial. Stable on 3L NC.   - repeat CXR  - keep SpO2 at 88-92%. - duonebs q4h PRN     Concern for C diff, now negative  Lab where pt was at prior to [facility] admission came with positive C diff sample on 8/18. CT abdomen showed concern for colitis. Repeat C. diff antigen negative.  - d/c PO vancomycin, IV flagyl  - d/c contact precautions     Hx of Alcoholism  Patient has been tremulous during examination. Yesterday, pt was more agitated, trying to get oob. - CIWA protocol     HLD  - continue home 40mg atorvastatin qhs     Hx of HTN  - holding home norvasc and benazepril in setting of hypotension     Prediabetes   Last A1C was 6.9.   -Continue with diet control and sliding scale insulin    Demential related behavioral disturbances  - continue seroquel 25 BID, zoloft 75 daily        Code Status: DNR-CC  F/E/N: Dietary Nutrition Supplements: Diabetic Oral Supplement  DIET CARB CONTROL;  [place] Thick   GI / DVT Prophylaxis: eliquis  Disposition:  [facility]      Jenae Cantrell MD  Internal Medicine, PGY 1  Pager: [phone]      I will discuss the patient with the senior resident and attending, Norbert Diego MD

## 2018-08-21 NOTE — PROGRESS NOTES
Speech Language Pathology  Assessment attempt     @8 am-  Referral rec'd, chart reviewed. Spoke with RN who reported pt was coughing with thin liquids yesterday. RN thickened liquids to nectar consistency then no s/s aspiration occurred. Attempted to initiate assessment this morning, but unable to wake pt. Will attempt again later. Discussed with RN. Ruben Barraza MA, Ronni Ni  Speech-Language Pathologist  Pager [phone]    @ 10:10 am   attempted to see pt again this date to initiate swallowing eval, but still unable to wake pt. Nephew/POA present. Reported pt had CVA in May 2018. Pt seen at [facility]-  bedside swallow eval was performed and swallowing was [place]. Currently, pt is not safe for PO intake in his given state. Will attempt to see again later. Pager number left on board in pt's room for RN to page when pt is alert and able to participate in assessment.     Ruben Barraza MA, Ronni Ni  Speech-Language Pathologist  Pager [phone]

## 2018-08-21 NOTE — PROGRESS NOTES
Pt. Responds to any stimuli with moaning only. Pt. Unable to turn self and found incontinent large mucousy green stool. Right inner buttock with stage 2 small spots grouped together, left inner buttock with red stage 1 spots only. MAD most likely cause and friction from scooting in bed. Recommend Venelex ointment to protect and stimulate capillary blood flow to areas to heal. Nephew and [employer] present and instructed on all, both verbalizing understanding/agreement with all. No other pressure issues noted on full skin assessment.

## 2018-08-21 NOTE — DISCHARGE SUMMARY
hours as needed for Pain             albuterol-ipratropium (COMBIVENT RESPIMAT)  MCG/ACT AERS inhaler  Inhale 1 puff into the lungs every 6 hours as needed for Wheezing             amLODIPine (NORVASC) 5 MG tablet  Take 5 mg by mouth daily             apixaban (ELIQUIS STARTER PACK) 5 MG TABS tablet  Take 10 mg (2 tablets) orally twice daily for 4 days, then take 5 mg (1 tablet) orally twice daily thereafter for 6 months             Artificial Saliva (BIOTENE MOISTURIZING MOUTH) SOLN  Take 15 mLs by mouth as needed             atorvastatin (LIPITOR) 40 MG tablet  Take 1 tablet by mouth nightly             ferrous sulfate 325 (65 Fe) MG tablet  Take 325 mg by mouth 2 times daily             fluticasone-salmeterol (ADVAIR DISKUS) 100-50 MCG/DOSE diskus inhaler  Inhale 1 puff into the lungs every 12 hours             loperamide (IMODIUM) 2 MG capsule  Take 2 mg by mouth 4 times daily as needed for Diarrhea             Multiple Vitamins-Minerals (MULTIVITAMIN ADULT PO)  Take 1 tablet by mouth daily             omeprazole (PRILOSEC) 20 MG capsule  Take 40 mg by mouth daily              QUEtiapine (SEROQUEL) 25 MG tablet  Take 1 tablet by mouth 2 times daily             sertraline (ZOLOFT) 25 MG tablet  Take 75 mg by mouth daily One time a day for depression give with 50 mg tablet for a 75 mg total dose                   Discharge Instructions:   · Activity: {discharge activity:16518}  · Diet: {diet:16999}  · Follow-up with Arlene Humphrey DO in 5 days      Discharge Exam:    Vitals:    08/21/18 1508   BP: 101/61   Pulse: 94   Resp:    Temp: 98.5 °F (36.9 °C)   SpO2: 93%       Physical Exam: ***     Disposition:   {disposition:94402}      Signed:  Jarett Martin MD PGY-1, pager [phone]

## 2018-08-22 PROBLEM — Z51.5 HOSPICE CARE: Status: ACTIVE | Noted: 2018-01-01

## 2018-08-22 NOTE — PROGRESS NOTES
Patient given Oral Morphine and Ativan per hospice orders overnight (see MAR). Minimally responsive to verbal stimuli, opens eyes only momentarily. Incontinence cleansed and repositioned q2h overnight. Will continue to monitor. Carlos Quezada.  8/22/2018

## 2018-08-22 NOTE — PLAN OF CARE
Problem: Falls - Risk of:  Goal: Will remain free from falls  Will remain free from falls   Pt had no falls this shift. Pt is a Fall Risk. See Vertell Forts Fall Risk Score. Pt bed in low position and side rails up. Call light and belongings in reach. Will continue with hourly rounds for PO intake, pain needs, toileting, and repositioning as needed. Problem: Risk for Impaired Skin Integrity  Goal: Tissue integrity - skin and mucous membranes  Structural intactness and normal physiological function of skin and  mucous membranes. Intervention: PRESSURE ULCER PREVENTION  Pt turned Q2 to prevent skin breakdown.

## 2018-08-22 NOTE — PROGRESS NOTES
Issues Being Addressed:    49-year-old gentleman admitted with septic shock due to UTI    Septic shock secondary to UTI  Acute kidney injury  Recent pulmonary embolism  Encephalopathy  After discussion with family patient made comfort care and hospice and currently inpatient hospice  Continue p.r.n.  Ativan and morphine    Code Status: DNR-CC        Ryley Bach MD

## 2018-08-22 NOTE — FLOWSHEET NOTE
08/22/18 1556   Encounter Summary   Services provided to: Patient   Referral/Consult From: Palliative Care;Rounding   Continue Visiting (8/22, maria )   Complexity of Encounter Moderate   Length of Encounter 15 minutes   Routine   Type Initial   Assessment Calm; Approachable   Intervention Active listening;Explored feelings, thoughts, concerns   Outcome Expressed gratitude; Refused/declined

## 2018-08-23 NOTE — PLAN OF CARE
Problem: Falls - Risk of:  Goal: Will remain free from falls  Will remain free from falls   Pt remained free from falls. Hospice at bedside and uses call light for appropriately when assistance is needed.

## 2018-08-23 NOTE — PLAN OF CARE
Problem: Risk for Impaired Skin Integrity  Goal: Tissue integrity - skin and mucous membranes  Structural intactness and normal physiological function of skin and  mucous membranes. Outcome: Met This Shift  Skin assessment position change. Pt has pre-existing st 2 ulcers at intergluteal folds. Venelex applied per [facility] for skin protection and to promote healing. Q2 turn with pillow support for comfort and pressure relief.  Will monitor

## 2018-08-23 NOTE — PROGRESS NOTES
Hospitalist Progress Note      PCP: Nayla Davis DO    Date of Admission: 8/21/2018    Chief Complaint: Inpatient hospice    Hospital Course:  Patient nonresponsive  Overall no change in his condition  Seems comfortable        Medications:  Reviewed      Exam:    BP (!) 87/40   Pulse 122   Temp 97.9 °F (36.6 °C) (Axillary)   Resp 23   SpO2 (!) 89%     General appearance: No apparent distress,. HEENT: Pupils equal, round, and reactive to light. Conjunctivae/corneas clear. Neck: Supple, with full range of motion. No jugular venous distention. Trachea midline. Respiratory:  Normal respiratory effort. Clear to auscultation, bilaterally without RALES/WHEEZES/Rhonchi. Cardiovascular: Regular rate and rhythm with normal S1/S2 without MURMURS, rubs or gallops. Abdomen: Soft, non-tender, non-distended with normal bowel sounds. Neurologic:  Nonresponsive  Physical exam unchanged from 8/22        Labs:   Recent Labs      08/21/18   0720   WBC  21.1*   HGB  7.5*   HCT  23.7*   PLT  262     Recent Labs      08/21/18   0720   NA  139   K  3.3*   CL  116*   CO2  12*   BUN  59*   CREATININE  2.3*   CALCIUM  6.8*   PHOS  4.5     No results for input(s): AST, ALT, BILIDIR, BILITOT, ALKPHOS in the last 72 hours. No results for input(s): INR in the last 72 hours. No results for input(s): Bonnielee Churn in the last 72 hours.     Urinalysis:      Lab Results   Component Value Date    NITRU POSITIVE 08/20/2018    WBCUA 3-5 08/20/2018    BACTERIA 2+ 08/20/2018    RBCUA >100 08/20/2018    BLOODU LARGE 08/20/2018    SPECGRAV 1.020 08/20/2018    GLUCOSEU 100 08/20/2018    GLUCOSEU NEGATIVE 12/28/2011       Radiology:  No orders to display       Assessment/Plan:    Active Hospital Problems    Diagnosis Date Noted    Hospice care [Z51.5] 08/22/2018       Acute Medical Issues Being Addressed:    68-year-old gentleman admitted with septic shock due to UTI    Septic shock secondary to UTI  Acute kidney injury  Recent pulmonary embolism  Encephalopathy    Patient seems comfortable  Continue p.r.n.  Ativan and morphine  Hospice following      Code Status: DNR-CC        Marycruz Owens MD

## 2018-08-24 NOTE — DISCHARGE SUMMARY
Physician Discharge Summary     Patient ID:  Tanika Pereira  [id number]  58 y.o.  1934    Admit date: 2018    Discharge date and time: No discharge date for patient encounter.      Admitting Physician: Lala Green MD     Discharge Physician: Lorena Rutledge MD    Admission Diagnoses: Hospice care [Z51.5]    Discharge Diagnoses:   Pt            Hospital Course:   80-year-old gentleman admitted with septic shock due to UTI     Septic shock secondary to UTI  Acute kidney injury  Recent pulmonary embolism  Encephalopathy     Patient was made comfort care and hospice for consulted  Patient passed away on  time 1:30 PM      Disposition: pt     In process/preliminary results:  Outstanding Order Results     Date and Time Order Name Status Description    2018 1810 Culture blood #2 Preliminary     2018 1700 Culture blood #1 Preliminary           Patient Instructions:   Current Discharge Medication List      STOP taking these medications       apixaban (ELIQUIS STARTER PACK) 5 MG TABS tablet Comments:   Reason for Stopping:         QUEtiapine (SEROQUEL) 25 MG tablet Comments:   Reason for Stopping:         albuterol-ipratropium (COMBIVENT RESPIMAT)  MCG/ACT AERS inhaler Comments:   Reason for Stopping:         fluticasone-salmeterol (ADVAIR DISKUS) 100-50 MCG/DOSE diskus inhaler Comments:   Reason for Stopping:         Artificial Saliva (BIOTENE MOISTURIZING MOUTH) SOLN Comments:   Reason for Stopping:         ferrous sulfate 325 (65 Fe) MG tablet Comments:   Reason for Stopping:         loperamide (IMODIUM) 2 MG capsule Comments:   Reason for Stopping:         Multiple Vitamins-Minerals (MULTIVITAMIN ADULT PO) Comments:   Reason for Stopping:         sertraline (ZOLOFT) 25 MG tablet Comments:   Reason for Stopping:         amLODIPine (NORVASC) 5 MG tablet Comments:   Reason for Stopping:         acetaminophen (TYLENOL) 325 MG tablet Comments:   Reason for Stopping:         atorvastatin

## 2018-08-24 NOTE — PROGRESS NOTES
Morphine and ativan given per hospice nurse request to premedicate prior to bathing, repositioning, linen change

## 2018-08-29 LAB
EKG ATRIAL RATE: 101 BPM
EKG DIAGNOSIS: NORMAL
EKG P AXIS: 25 DEGREES
EKG P-R INTERVAL: 166 MS
EKG Q-T INTERVAL: 336 MS
EKG QRS DURATION: 82 MS
EKG QTC CALCULATION (BAZETT): 435 MS
EKG R AXIS: 4 DEGREES
EKG T AXIS: 14 DEGREES
EKG VENTRICULAR RATE: 101 BPM

## 2018-10-04 NOTE — DISCHARGE SUMMARY
Temp 98.5 °F (36.9 °C) (Axillary)   Resp 24   Ht 5' 8\" (1.727 m)   Wt 169 lb 15.6 oz (77.1 kg)   SpO2 93%   BMI 25.84 kg/m²   Lungs: clear to auscultation bilaterally  Heart: regular rate and rhythm, S1, S2 normal, no murmur, click, rub or gallop  Abdomen: soft, non-tender; bowel sounds normal; no masses,  no organomegaly  Neurologic: Grossly normal    Disposition: inpatient hospice    In process/preliminary results:  Outstanding Order Results     No orders found from 7/21/2018 to 8/20/2018.             Activity: bedrest  Diet: regular diet      Signed:  Shady Jama MD  10/4/2018  3:11 PM

## 2025-06-16 NOTE — PROGRESS NOTES
Physical Therapy / Occupational Therapy  DISCHARGE    Chart reviewed. Noted family choosing comfort care for pt and hospice consulted. Will sign off with acute PT/OT.     Jose Luis Swartz   Kayla Brake, [address] [place] [address] 0